# Patient Record
Sex: FEMALE | Race: BLACK OR AFRICAN AMERICAN | NOT HISPANIC OR LATINO | Employment: OTHER | URBAN - METROPOLITAN AREA
[De-identification: names, ages, dates, MRNs, and addresses within clinical notes are randomized per-mention and may not be internally consistent; named-entity substitution may affect disease eponyms.]

---

## 2017-11-08 ENCOUNTER — HOSPITAL ENCOUNTER (INPATIENT)
Facility: HOSPITAL | Age: 62
LOS: 8 days | Discharge: HOME | DRG: 435 | End: 2017-11-16
Attending: EMERGENCY MEDICINE | Admitting: INTERNAL MEDICINE
Payer: MEDICAID

## 2017-11-08 DIAGNOSIS — I82.220 NEOPLASM OF RIGHT KIDNEY WITH THROMBUS OF INFERIOR VENA CAVA: Primary | ICD-10-CM

## 2017-11-08 DIAGNOSIS — D49.511 NEOPLASM OF RIGHT KIDNEY WITH THROMBUS OF INFERIOR VENA CAVA: Primary | ICD-10-CM

## 2017-11-08 DIAGNOSIS — R16.0 LIVER MASSES: ICD-10-CM

## 2017-11-08 DIAGNOSIS — D72.829 LEUKOCYTOSIS: ICD-10-CM

## 2017-11-08 DIAGNOSIS — K86.89 MASS OF PANCREAS: ICD-10-CM

## 2017-11-08 DIAGNOSIS — R53.1 WEAKNESS: ICD-10-CM

## 2017-11-08 DIAGNOSIS — Z90.5 HISTORY OF NEPHRECTOMY: ICD-10-CM

## 2017-11-08 PROBLEM — E44.1 MILD MALNUTRITION: Status: ACTIVE | Noted: 2017-11-08

## 2017-11-08 PROBLEM — Z85.528 HISTORY OF RENAL CELL CARCINOMA: Status: ACTIVE | Noted: 2017-11-08

## 2017-11-08 PROBLEM — D50.9 IRON DEFICIENCY ANEMIA: Status: ACTIVE | Noted: 2017-11-08

## 2017-11-08 PROBLEM — D64.9 ANEMIA: Status: ACTIVE | Noted: 2017-11-08

## 2017-11-08 LAB
ALBUMIN SERPL BCP-MCNC: 2.2 G/DL
ALP SERPL-CCNC: 177 U/L
ALT SERPL W/O P-5'-P-CCNC: 10 U/L
ANION GAP SERPL CALC-SCNC: 12 MMOL/L
ANISOCYTOSIS BLD QL SMEAR: SLIGHT
AST SERPL-CCNC: 38 U/L
BASOPHILS # BLD AUTO: ABNORMAL K/UL
BASOPHILS NFR BLD: 0 %
BILIRUB SERPL-MCNC: 0.8 MG/DL
BILIRUB UR QL STRIP: NEGATIVE
BUN SERPL-MCNC: 31 MG/DL (ref 6–30)
BUN SERPL-MCNC: 33 MG/DL
CALCIUM SERPL-MCNC: 8.5 MG/DL
CHLORIDE SERPL-SCNC: 100 MMOL/L (ref 95–110)
CHLORIDE SERPL-SCNC: 102 MMOL/L
CLARITY UR REFRACT.AUTO: CLEAR
CO2 SERPL-SCNC: 22 MMOL/L
COLOR UR AUTO: YELLOW
CREAT SERPL-MCNC: 0.8 MG/DL
CREAT SERPL-MCNC: 1 MG/DL (ref 0.5–1.4)
DIFFERENTIAL METHOD: ABNORMAL
EOSINOPHIL # BLD AUTO: ABNORMAL K/UL
EOSINOPHIL NFR BLD: 0 %
ERYTHROCYTE [DISTWIDTH] IN BLOOD BY AUTOMATED COUNT: 15.4 %
EST. GFR  (AFRICAN AMERICAN): >60 ML/MIN/1.73 M^2
EST. GFR  (NON AFRICAN AMERICAN): >60 ML/MIN/1.73 M^2
GLUCOSE SERPL-MCNC: 95 MG/DL
GLUCOSE SERPL-MCNC: 95 MG/DL (ref 70–110)
GLUCOSE UR QL STRIP: NEGATIVE
HCT VFR BLD AUTO: 32 %
HCT VFR BLD CALC: 30 %PCV (ref 36–54)
HGB BLD-MCNC: 10.2 G/DL
HGB UR QL STRIP: NEGATIVE
HYPOCHROMIA BLD QL SMEAR: ABNORMAL
IMM GRANULOCYTES # BLD AUTO: ABNORMAL K/UL
IMM GRANULOCYTES NFR BLD AUTO: ABNORMAL %
KETONES UR QL STRIP: ABNORMAL
LACTATE SERPL-SCNC: 2 MMOL/L
LEUKOCYTE ESTERASE UR QL STRIP: ABNORMAL
LIPASE SERPL-CCNC: 54 U/L
LYMPHOCYTES # BLD AUTO: ABNORMAL K/UL
LYMPHOCYTES NFR BLD: 7 %
MCH RBC QN AUTO: 23.3 PG
MCHC RBC AUTO-ENTMCNC: 31.9 G/DL
MCV RBC AUTO: 73 FL
METAMYELOCYTES NFR BLD MANUAL: 1 %
MICROSCOPIC COMMENT: NORMAL
MONOCYTES # BLD AUTO: ABNORMAL K/UL
MONOCYTES NFR BLD: 5 %
MYELOCYTES NFR BLD MANUAL: 1 %
NEUTROPHILS NFR BLD: 86 %
NITRITE UR QL STRIP: NEGATIVE
NRBC BLD-RTO: 0 /100 WBC
PH UR STRIP: 5 [PH] (ref 5–8)
PLATELET # BLD AUTO: 322 K/UL
PLATELET BLD QL SMEAR: ABNORMAL
PMV BLD AUTO: 12.3 FL
POC IONIZED CALCIUM: 1.1 MMOL/L (ref 1.06–1.42)
POC TCO2 (MEASURED): 21 MMOL/L (ref 23–29)
POTASSIUM BLD-SCNC: 4.8 MMOL/L (ref 3.5–5.1)
POTASSIUM SERPL-SCNC: 4.9 MMOL/L
PROT SERPL-MCNC: 7.2 G/DL
PROT UR QL STRIP: NEGATIVE
RBC # BLD AUTO: 4.38 M/UL
RBC #/AREA URNS AUTO: 1 /HPF (ref 0–4)
SAMPLE: ABNORMAL
SODIUM BLD-SCNC: 135 MMOL/L (ref 136–145)
SODIUM SERPL-SCNC: 136 MMOL/L
SP GR UR STRIP: 1.01 (ref 1–1.03)
SQUAMOUS #/AREA URNS AUTO: 1 /HPF
TROPONIN I SERPL DL<=0.01 NG/ML-MCNC: <0.006 NG/ML
URN SPEC COLLECT METH UR: ABNORMAL
UROBILINOGEN UR STRIP-ACNC: NEGATIVE EU/DL
WBC # BLD AUTO: 24.62 K/UL
WBC #/AREA URNS AUTO: 1 /HPF (ref 0–5)
WBC TOXIC VACUOLES BLD QL SMEAR: PRESENT

## 2017-11-08 PROCEDURE — 84484 ASSAY OF TROPONIN QUANT: CPT

## 2017-11-08 PROCEDURE — 96360 HYDRATION IV INFUSION INIT: CPT

## 2017-11-08 PROCEDURE — 80053 COMPREHEN METABOLIC PANEL: CPT

## 2017-11-08 PROCEDURE — 99285 EMERGENCY DEPT VISIT HI MDM: CPT | Mod: ,,, | Performed by: EMERGENCY MEDICINE

## 2017-11-08 PROCEDURE — 93005 ELECTROCARDIOGRAM TRACING: CPT

## 2017-11-08 PROCEDURE — 93010 ELECTROCARDIOGRAM REPORT: CPT | Mod: ,,, | Performed by: INTERNAL MEDICINE

## 2017-11-08 PROCEDURE — 25500020 PHARM REV CODE 255: Performed by: EMERGENCY MEDICINE

## 2017-11-08 PROCEDURE — 81001 URINALYSIS AUTO W/SCOPE: CPT

## 2017-11-08 PROCEDURE — 25000003 PHARM REV CODE 250: Performed by: EMERGENCY MEDICINE

## 2017-11-08 PROCEDURE — 85007 BL SMEAR W/DIFF WBC COUNT: CPT

## 2017-11-08 PROCEDURE — 85027 COMPLETE CBC AUTOMATED: CPT

## 2017-11-08 PROCEDURE — 11000001 HC ACUTE MED/SURG PRIVATE ROOM

## 2017-11-08 PROCEDURE — 83605 ASSAY OF LACTIC ACID: CPT

## 2017-11-08 PROCEDURE — 83690 ASSAY OF LIPASE: CPT

## 2017-11-08 PROCEDURE — 99285 EMERGENCY DEPT VISIT HI MDM: CPT | Mod: 25

## 2017-11-08 RX ORDER — ACETAMINOPHEN 325 MG/1
650 TABLET ORAL EVERY 4 HOURS PRN
Status: DISCONTINUED | OUTPATIENT
Start: 2017-11-08 | End: 2017-11-16 | Stop reason: HOSPADM

## 2017-11-08 RX ORDER — ONDANSETRON 2 MG/ML
4 INJECTION INTRAMUSCULAR; INTRAVENOUS EVERY 6 HOURS PRN
Status: DISCONTINUED | OUTPATIENT
Start: 2017-11-08 | End: 2017-11-16 | Stop reason: HOSPADM

## 2017-11-08 RX ORDER — AMOXICILLIN 250 MG
1 CAPSULE ORAL DAILY PRN
Status: DISCONTINUED | OUTPATIENT
Start: 2017-11-08 | End: 2017-11-16 | Stop reason: HOSPADM

## 2017-11-08 RX ORDER — METRONIDAZOLE 500 MG/100ML
500 INJECTION, SOLUTION INTRAVENOUS
Status: DISCONTINUED | OUTPATIENT
Start: 2017-11-08 | End: 2017-11-09

## 2017-11-08 RX ORDER — GLUCAGON 1 MG
1 KIT INJECTION
Status: DISCONTINUED | OUTPATIENT
Start: 2017-11-08 | End: 2017-11-16 | Stop reason: HOSPADM

## 2017-11-08 RX ORDER — IBUPROFEN 200 MG
16 TABLET ORAL
Status: DISCONTINUED | OUTPATIENT
Start: 2017-11-08 | End: 2017-11-16 | Stop reason: HOSPADM

## 2017-11-08 RX ORDER — METOCLOPRAMIDE HYDROCHLORIDE 5 MG/ML
5 INJECTION INTRAMUSCULAR; INTRAVENOUS EVERY 6 HOURS PRN
Status: DISCONTINUED | OUTPATIENT
Start: 2017-11-08 | End: 2017-11-16 | Stop reason: HOSPADM

## 2017-11-08 RX ORDER — IBUPROFEN 200 MG
24 TABLET ORAL
Status: DISCONTINUED | OUTPATIENT
Start: 2017-11-08 | End: 2017-11-16 | Stop reason: HOSPADM

## 2017-11-08 RX ORDER — OXYCODONE HYDROCHLORIDE 5 MG/1
5 TABLET ORAL EVERY 4 HOURS PRN
Status: DISCONTINUED | OUTPATIENT
Start: 2017-11-08 | End: 2017-11-16 | Stop reason: HOSPADM

## 2017-11-08 RX ADMIN — IOHEXOL 75 ML: 350 INJECTION, SOLUTION INTRAVENOUS at 06:11

## 2017-11-08 RX ADMIN — SODIUM CHLORIDE 1000 ML: 0.9 INJECTION, SOLUTION INTRAVENOUS at 04:11

## 2017-11-08 NOTE — ED TRIAGE NOTES
"Patient states RUQ abd "cramping" Denies n/v. Positive weakness. Family states poor appetite and not eating. States baseline confusion on and off x 1 year.   "

## 2017-11-08 NOTE — ED PROVIDER NOTES
Encounter Date: 11/8/2017    SCRIBE #1 NOTE: I, Iram Morgan, am scribing for, and in the presence of,  Dr. Lenz. I have scribed the entire note.   SCRIBE #2 NOTE: I, Katie Fischer, am scribing for, and in the presence of,  Dr. Madden. I have scribed the following portions of the note - the Resident Attestation.     History     Chief Complaint   Patient presents with    Abdominal Pain     having pain on r side where kidney was removed 1 yr ago, nausea, not eating only drinking     Time patient was seen by the provider: 3:48 PM      The patient is a 62 y.o. female with hx of RCC s/p R nephrectomy in 2016, Hx of thyroid disorder ( no medication) and recently diagnosed liver and pancreatic masses ( June) w/ concern for metastatic disease and scheduled f/u on the 20th of Nov at [facility] presents to the ED with a complaint of decreased appetite and abdominal discomfort. Patient's family member states she has had a decrease in PO intake since Sunday, drinking only a fair amount of water. Yesterday she felt lightheaded and weak. She has had confusion for about a year intermittently. She has not seen a physician for her confusion at this time. Patient denies dysuria or any myalgias. She does have a tumor on her liver and pancreas that the family is aware of. Pt has no other complaints at this time.      The history is provided by the patient, a relative and medical records.     Review of patient's allergies indicates:  No Known Allergies  Past Medical History:   Diagnosis Date    Cancer     kidney    Renal disorder     Tumor liver      Past Surgical History:   Procedure Laterality Date    CHOLECYSTECTOMY      HYSTERECTOMY      kidney removal       History reviewed. No pertinent family history.  Social History   Substance Use Topics    Smoking status: Never Smoker    Smokeless tobacco: Never Used    Alcohol use No     Review of Systems   Constitutional: Positive for appetite change. Negative for fever.   HENT:  Negative for sore throat.    Respiratory: Negative for shortness of breath.    Cardiovascular: Negative for chest pain.   Gastrointestinal: Negative for nausea.   Genitourinary: Negative for dysuria.   Musculoskeletal: Negative for back pain.   Skin: Negative for rash.   Neurological: Positive for weakness and light-headedness.        (+)confusion   Hematological: Does not bruise/bleed easily.       Physical Exam     Initial Vitals [11/08/17 1432]   BP Pulse Resp Temp SpO2   (!) 131/92 (!) 116 18 97.8 °F (36.6 °C) 99 %      MAP       105         Physical Exam    Nursing note and vitals reviewed.  Constitutional: No distress.   HENT:   Head: Normocephalic and atraumatic.   Eyes: Pupils are equal, round, and reactive to light.   Neck: Normal range of motion. Neck supple.   Cardiovascular:   Tachycardic   Pulmonary/Chest: Breath sounds normal.   Abdominal: Soft.   Neurological:   Confusion which family states is baseline for her.   Skin: Skin is warm and dry.         ED Course   Procedures  Labs Reviewed   CBC W/ AUTO DIFFERENTIAL - Abnormal; Notable for the following:        Result Value    WBC 24.62 (*)     Hemoglobin 10.2 (*)     Hematocrit 32.0 (*)     MCV 73 (*)     MCH 23.3 (*)     MCHC 31.9 (*)     RDW 15.4 (*)     Gran% 86.0 (*)     Lymph% 7.0 (*)     All other components within normal limits   URINALYSIS, REFLEX TO URINE CULTURE - Abnormal; Notable for the following:     Ketones, UA Trace (*)     Leukocytes, UA Trace (*)     All other components within normal limits    Narrative:     Preferred Collection Type->Urine, Catheterized   COMPREHENSIVE METABOLIC PANEL - Abnormal; Notable for the following:     CO2 22 (*)     BUN, Bld 33 (*)     Calcium 8.5 (*)     Albumin 2.2 (*)     Alkaline Phosphatase 177 (*)     All other components within normal limits   ISTAT PROCEDURE - Abnormal; Notable for the following:     POC BUN 31 (*)     POC Sodium 135 (*)     POC TCO2 (MEASURED) 21 (*)     POC Hematocrit 30 (*)      All other components within normal limits   TROPONIN I   LACTIC ACID, PLASMA   URINALYSIS MICROSCOPIC    Narrative:     Preferred Collection Type->Urine, Catheterized   LIPASE   PROCALCITONIN   B-TYPE NATRIURETIC PEPTIDE   LIPASE    Narrative:     ADD ON LIPASE PER DR LEANDRO PARIKH/ORDER# [id number] @ 20:37 11/8/17     EKG Readings: (Independently Interpreted)   Sinus tachycardia. Non specific ST wave changes at 123 bpm.           Medical Decision Making:   History:   Old Medical Records: I decided to obtain old medical records.  Initial Assessment:   Weakness and difficulty getting around today. She is tachycardic and at risk for infection. Will do labs to determine disposition.   Independently Interpreted Test(s):   I have ordered and independently interpreted EKG Reading(s) - see prior notes  Clinical Tests:   Lab Tests: Ordered and Reviewed  Medical Tests: Ordered and Reviewed   Pt presents to the ED w/ complaints of decreased PO intake and nausea as well as pulling sensation/discomfort in the RUQ. Ddx includes but is not limited to dehydration, infection,metastatic disease, SBO.  Will obtain basic labs, lipase, XCR, EKG to evaluate the patient.     5:57 PM  CBC w/ significant leukocytosis  (24.62). As pt complained of RUQ pain prior to arrival will order CT abdo/pelvis w/ contrast to evaluate for intra-abdominal pathology. Will follow up w/ results.    7:24 PM  CT pelvis/abdomen w/ contrast consistent w/ multiple metastatic lesions in the liver ( largest in R lobe measuring 7.4 cm) as well as a mass in the pancrease. No obstruction noted. 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    8:03 PM  Discussed case w/ Dr. Madden. He agrees that an inpatient admission is required. Will start heparin bolus and drip for the newly found IVC thrombus after we obtain CT head to r/o metastases in the setting of AMS and likely metastatic disease affecting the liver and pancreas.     8:47 PM  Discussed case w/  that is the  overnight hospitalist - will admit to hospital medicine IM 2     Attending Note:  Physician Attestation Statement: I have personally seen and examined this patient. As the supervising MD I agree with the above history. As the supervising MD I agree with the above PE. As the supervising MD I agree with the above treatment, course, plan, and disposition.               Scribe Attestation:   Scribe #1: I performed the above scribed service and the documentation accurately describes the services I performed. I attest to the accuracy of the note.  Scribe #2: I performed the above scribed service and the documentation accurately describes the services I performed. I attest to the accuracy of the note.    Attending Attestation:   Physician Attestation Statement for Resident:  As the supervising MD   Physician Attestation Statement: I have personally seen and examined this patient.   I agree with the above history. -: Pt with PMHx of renal cell carcinoma with mets to liver and pancreas presents with right sided abdominal pain, nausea, decreased PO intake and slow decline in mental status over the last few months according to family. This pt was initially evaluated by Dr. Lenz where labs and CT abdomen/pelvis were ordered and pt was signed out to me. CT abdomen/pelvis demonstrates multiple liver lesions, a pancreas lesion and also IVC thrombus. Labs notable for leukocytosis. CT abdomen/pelvis does not demonstrate any definitive evidence of intraabdominal infection. Though given leukocytosis and tachycardia, felt she warranted dose of empiric antibiotics until more information could be obtained. Also ordered CT head to rule out metastatic lesions prior to initiating heparin drip for IVC thrombus. Discussion held with pt regarding transfer to [facility] given all of her care thus far as been at [facility]. Pt states she would prefer to stay here at [facility] and get her care here. Medicine called and accepted the pt for admission.   As the  supervising MD I agree with the above PE.    As the supervising MD I agree with the above treatment, course, plan, and disposition.  I have reviewed and agree with the residents interpretation of the following: lab data, x-rays, EKG and CT scans.                    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                           Chris Lenz MD  11/12/17 0110

## 2017-11-08 NOTE — ED NOTES
Patient identifiers verified and correct for MS Chapa  C/C: Abd pain  APPEARANCE: awake and alert to person and place only  SKIN: warm, dry and intact. No breakdown or bruising.  MUSCULOSKELETAL: Patient moving all extremities spontaneously, no obvious swelling or deformities noted. Ambulates independently.  RESPIRATORY: Denies shortness of breath.Respirations unlabored.   CARDIAC: Denies CP, 2+ distal pulses; no peripheral edema  ABDOMEN: S/ND/NT, Denies nausea or vomiting. Poor appetite  : voids spontaneously, denies difficulty  Neurologic: AAO x 4; follows commands equal strength in all extremities; denies numbness/tingling. Denies dizziness Positive weakness

## 2017-11-09 PROBLEM — R53.1 WEAKNESS: Status: ACTIVE | Noted: 2017-11-09

## 2017-11-09 LAB
ALBUMIN SERPL BCP-MCNC: 2.1 G/DL
ALP SERPL-CCNC: 168 U/L
ALT SERPL W/O P-5'-P-CCNC: 12 U/L
ANION GAP SERPL CALC-SCNC: 13 MMOL/L
APTT BLDCRRT: 33.4 SEC
AST SERPL-CCNC: 39 U/L
BASOPHILS # BLD AUTO: 0.03 K/UL
BASOPHILS NFR BLD: 0.2 %
BILIRUB SERPL-MCNC: 0.6 MG/DL
BUN SERPL-MCNC: 23 MG/DL
CALCIUM SERPL-MCNC: 8.8 MG/DL
CHLORIDE SERPL-SCNC: 102 MMOL/L
CO2 SERPL-SCNC: 22 MMOL/L
CREAT SERPL-MCNC: 0.8 MG/DL
DIFFERENTIAL METHOD: ABNORMAL
EOSINOPHIL # BLD AUTO: 0.3 K/UL
EOSINOPHIL NFR BLD: 1.8 %
ERYTHROCYTE [DISTWIDTH] IN BLOOD BY AUTOMATED COUNT: 15.3 %
EST. GFR  (AFRICAN AMERICAN): >60 ML/MIN/1.73 M^2
EST. GFR  (NON AFRICAN AMERICAN): >60 ML/MIN/1.73 M^2
ESTIMATED AVG GLUCOSE: 105 MG/DL
ESTIMATED AVG GLUCOSE: 105 MG/DL
FERRITIN SERPL-MCNC: 4230 NG/ML
GLUCOSE SERPL-MCNC: 103 MG/DL
HBA1C MFR BLD HPLC: 5.3 %
HBA1C MFR BLD HPLC: 5.3 %
HCT VFR BLD AUTO: 28.3 %
HGB BLD-MCNC: 9 G/DL
IMM GRANULOCYTES # BLD AUTO: 0.15 K/UL
IMM GRANULOCYTES NFR BLD AUTO: 0.8 %
INR PPP: 1.8
IRON SERPL-MCNC: 11 UG/DL
LACTATE SERPL-SCNC: 1.6 MMOL/L
LYMPHOCYTES # BLD AUTO: 1.2 K/UL
LYMPHOCYTES NFR BLD: 6.7 %
MAGNESIUM SERPL-MCNC: 2 MG/DL
MCH RBC QN AUTO: 23.4 PG
MCHC RBC AUTO-ENTMCNC: 31.8 G/DL
MCV RBC AUTO: 74 FL
MONOCYTES # BLD AUTO: 1.5 K/UL
MONOCYTES NFR BLD: 8.3 %
NEUTROPHILS # BLD AUTO: 15.1 K/UL
NEUTROPHILS NFR BLD: 82.2 %
NRBC BLD-RTO: 0 /100 WBC
PHOSPHATE SERPL-MCNC: 2.6 MG/DL
PLATELET # BLD AUTO: 240 K/UL
PMV BLD AUTO: 11.5 FL
POTASSIUM SERPL-SCNC: 4.1 MMOL/L
PROT SERPL-MCNC: 7 G/DL
PROTHROMBIN TIME: 18.2 SEC
RBC # BLD AUTO: 3.84 M/UL
SATURATED IRON: 6 %
SODIUM SERPL-SCNC: 137 MMOL/L
TOTAL IRON BINDING CAPACITY: 178 UG/DL
TRANSFERRIN SERPL-MCNC: 120 MG/DL
WBC # BLD AUTO: 18.32 K/UL

## 2017-11-09 PROCEDURE — 85025 COMPLETE CBC W/AUTO DIFF WBC: CPT

## 2017-11-09 PROCEDURE — 83036 HEMOGLOBIN GLYCOSYLATED A1C: CPT | Mod: 91

## 2017-11-09 PROCEDURE — 83540 ASSAY OF IRON: CPT

## 2017-11-09 PROCEDURE — 87040 BLOOD CULTURE FOR BACTERIA: CPT | Mod: 59

## 2017-11-09 PROCEDURE — 83036 HEMOGLOBIN GLYCOSYLATED A1C: CPT

## 2017-11-09 PROCEDURE — 84100 ASSAY OF PHOSPHORUS: CPT

## 2017-11-09 PROCEDURE — 83605 ASSAY OF LACTIC ACID: CPT

## 2017-11-09 PROCEDURE — 80053 COMPREHEN METABOLIC PANEL: CPT

## 2017-11-09 PROCEDURE — 63600175 PHARM REV CODE 636 W HCPCS: Performed by: STUDENT IN AN ORGANIZED HEALTH CARE EDUCATION/TRAINING PROGRAM

## 2017-11-09 PROCEDURE — 63600175 PHARM REV CODE 636 W HCPCS: Performed by: EMERGENCY MEDICINE

## 2017-11-09 PROCEDURE — 99223 1ST HOSP IP/OBS HIGH 75: CPT | Mod: ,,, | Performed by: HOSPITALIST

## 2017-11-09 PROCEDURE — 25000003 PHARM REV CODE 250: Performed by: EMERGENCY MEDICINE

## 2017-11-09 PROCEDURE — 85730 THROMBOPLASTIN TIME PARTIAL: CPT

## 2017-11-09 PROCEDURE — 63600175 PHARM REV CODE 636 W HCPCS: Performed by: INTERNAL MEDICINE

## 2017-11-09 PROCEDURE — S0030 INJECTION, METRONIDAZOLE: HCPCS | Performed by: EMERGENCY MEDICINE

## 2017-11-09 PROCEDURE — 11000001 HC ACUTE MED/SURG PRIVATE ROOM

## 2017-11-09 PROCEDURE — 36415 COLL VENOUS BLD VENIPUNCTURE: CPT

## 2017-11-09 PROCEDURE — 82728 ASSAY OF FERRITIN: CPT

## 2017-11-09 PROCEDURE — 97161 PT EVAL LOW COMPLEX 20 MIN: CPT

## 2017-11-09 PROCEDURE — 83735 ASSAY OF MAGNESIUM: CPT

## 2017-11-09 PROCEDURE — 85610 PROTHROMBIN TIME: CPT

## 2017-11-09 PROCEDURE — 97166 OT EVAL MOD COMPLEX 45 MIN: CPT

## 2017-11-09 RX ORDER — ENOXAPARIN SODIUM 100 MG/ML
1.5 INJECTION SUBCUTANEOUS DAILY
Qty: 30 SYRINGE | Refills: 5 | Status: SHIPPED | OUTPATIENT
Start: 2017-11-09

## 2017-11-09 RX ORDER — GLUCAGON 1 MG
1 KIT INJECTION
Status: DISCONTINUED | OUTPATIENT
Start: 2017-11-09 | End: 2017-11-16 | Stop reason: HOSPADM

## 2017-11-09 RX ORDER — IBUPROFEN 200 MG
16 TABLET ORAL
Status: DISCONTINUED | OUTPATIENT
Start: 2017-11-09 | End: 2017-11-16 | Stop reason: HOSPADM

## 2017-11-09 RX ORDER — IBUPROFEN 200 MG
24 TABLET ORAL
Status: DISCONTINUED | OUTPATIENT
Start: 2017-11-09 | End: 2017-11-16 | Stop reason: HOSPADM

## 2017-11-09 RX ORDER — ENOXAPARIN SODIUM 100 MG/ML
1.5 INJECTION SUBCUTANEOUS
Status: DISCONTINUED | OUTPATIENT
Start: 2017-11-09 | End: 2017-11-16 | Stop reason: HOSPADM

## 2017-11-09 RX ADMIN — CEFTRIAXONE 2 G: 2 INJECTION, SOLUTION INTRAVENOUS at 12:11

## 2017-11-09 RX ADMIN — ENOXAPARIN SODIUM 70 MG: 100 INJECTION SUBCUTANEOUS at 12:11

## 2017-11-09 RX ADMIN — METOCLOPRAMIDE 5 MG: 5 INJECTION, SOLUTION INTRAMUSCULAR; INTRAVENOUS at 01:11

## 2017-11-09 RX ADMIN — ONDANSETRON 4 MG: 2 INJECTION INTRAMUSCULAR; INTRAVENOUS at 01:11

## 2017-11-09 RX ADMIN — ONDANSETRON 4 MG: 2 INJECTION INTRAMUSCULAR; INTRAVENOUS at 10:11

## 2017-11-09 RX ADMIN — VANCOMYCIN HYDROCHLORIDE 1500 MG: 10 INJECTION, POWDER, LYOPHILIZED, FOR SOLUTION INTRAVENOUS at 12:11

## 2017-11-09 RX ADMIN — METRONIDAZOLE 500 MG: 500 INJECTION, SOLUTION INTRAVENOUS at 12:11

## 2017-11-09 NOTE — SUBJECTIVE & OBJECTIVE
Past Medical History:   Diagnosis Date    Cancer     kidney    Renal disorder     Tumor liver        Past Surgical History:   Procedure Laterality Date    CHOLECYSTECTOMY      HYSTERECTOMY      kidney removal         Review of patient's allergies indicates:  No Known Allergies    No current facility-administered medications on file prior to encounter.      No current outpatient prescriptions on file prior to encounter.     Family History     None        Social History Main Topics    Smoking status: Never Smoker    Smokeless tobacco: Never Used    Alcohol use No    Drug use: No    Sexual activity: Not on file     Review of Systems   Constitutional: Positive for activity change, appetite change, fatigue and unexpected weight change. Negative for chills and fever.   HENT: Negative for congestion.    Eyes: Negative for visual disturbance.   Respiratory: Negative for cough and shortness of breath.    Cardiovascular: Negative for chest pain and leg swelling.   Gastrointestinal: Positive for abdominal pain and nausea. Negative for blood in stool, constipation, diarrhea and vomiting.   Endocrine: Negative for polyphagia and polyuria.   Genitourinary: Negative for dysuria, flank pain and hematuria.   Musculoskeletal: Negative for back pain.   Skin: Negative for rash.   Neurological: Positive for dizziness, weakness and light-headedness. Negative for seizures, syncope, speech difficulty, numbness and headaches.   Hematological: Does not bruise/bleed easily.   Psychiatric/Behavioral: Negative for agitation and behavioral problems.     Objective:     Vital Signs (Most Recent):  Temp: 98.6 °F (37 °C) (11/08/17 2225)  Pulse: 106 (11/08/17 2225)  Resp: 18 (11/08/17 2225)  BP: 126/79 (11/08/17 2225)  SpO2: 97 % (11/08/17 2225) Vital Signs (24h Range):  Temp:  [97.8 °F (36.6 °C)-98.6 °F (37 °C)] 98.6 °F (37 °C)  Pulse:  [100-116] 106  Resp:  [18] 18  SpO2:  [97 %-99 %] 97 %  BP: (126-153)/(79-92) 126/79     Weight: 69.4  kg (153 lb)  Body mass index is 26.26 kg/m².    Physical Exam   Constitutional: She is oriented to person, place, and time. She appears well-developed and well-nourished.   HENT:   Head: Normocephalic and atraumatic.   Eyes: EOM are normal. Pupils are equal, round, and reactive to light.   Neck: Normal range of motion. Neck supple.   Cardiovascular: Normal rate, regular rhythm and normal heart sounds.    No murmur heard.  Pulmonary/Chest: Effort normal and breath sounds normal.   Abdominal: Soft. Bowel sounds are normal. There is no tenderness.   Right healed scar with laparoscopic healed scars   Musculoskeletal: Normal range of motion. She exhibits no edema.   Neurological: She is alert and oriented to person, place, and time. She exhibits normal muscle tone.   Gets distracted in the middle of answering the question and forget what was the question, has hard time comprehending questions, does know the president she can describe him but doesn't remember his name   Skin: Skin is warm and dry. Capillary refill takes less than 2 seconds.   Psychiatric: She has a normal mood and affect. Her behavior is normal.        Significant Labs:   CBC:   Recent Labs  Lab 11/08/17  1559 11/08/17  1802   WBC 24.62*  --    HGB 10.2*  --    HCT 32.0* 30*     --      CMP:   Recent Labs  Lab 11/08/17  1758      K 4.9      CO2 22*   GLU 95   BUN 33*   CREATININE 0.8   CALCIUM 8.5*   PROT 7.2   ALBUMIN 2.2*   BILITOT 0.8   ALKPHOS 177*   AST 38   ALT 10   ANIONGAP 12   EGFRNONAA >60.0     Urine Studies:   Recent Labs  Lab 11/08/17  1559   COLORU Yellow   APPEARANCEUA Clear   PHUR 5.0   SPECGRAV 1.015   PROTEINUA Negative   GLUCUA Negative   KETONESU Trace*   BILIRUBINUA Negative   OCCULTUA Negative   NITRITE Negative   UROBILINOGEN Negative   LEUKOCYTESUR Trace*   RBCUA 1   WBCUA 1   SQUAMEPITHEL 1       Significant Imaging: I have reviewed and interpreted all pertinent imaging results/findings within the past 24  hours.

## 2017-11-09 NOTE — PLAN OF CARE
Problem: Patient Care Overview  Goal: Plan of Care Review  Patient had an uneventful night last night. Pt resting quietly. VS stable. Will continue to monitor patient.

## 2017-11-09 NOTE — ASSESSMENT & PLAN NOTE
- Would wait for the medical records from [facility] as they have done the biopsy on 6/17.

## 2017-11-09 NOTE — PT/OT/SLP EVAL
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       Past Medical History:   Diagnosis Date    Cancer     kidney    Renal disorder     Tumor liver       Past Surgical History:   Procedure Laterality Date    CHOLECYSTECTOMY      HYSTERECTOMY      kidney removal         Referring physician: Kaylyn MARAVILLA   Date referred to OT: 11/8/17    General Precautions: Standard, fall  Orthopedic Precautions: N/A  Braces:      Do you have any cultural, spiritual, Uatsdin conflicts, given your current situation?: None stated     Patient History:  Living Environment  Lives With: other relative(s) (niece)  Living Arrangements: house  Home Accessibility: stairs to enter home  Number of Stairs to Enter Home: 5  Stair Railings at Home: none  Transportation Available: family or friend will provide  Living Environment Comment: Pt lives in 1 story home w niece with 4 steps to enter with no railing.  Pt was overall I with basic self care.  Equipment Currently Used at Home: none    Prior level of function:   Bed Mobility/Transfers: independent  Grooming: independent  Bathing: independent  Upper Body Dressing: independent  Lower Body Dressing: independent  Toileting: independent  Home Management Skills: independent  Homemaking Responsibilities: No  Driving License: No  Mode of Transportation: Family     Dominant hand: right    Subjective:  Communicated with RN prior to session.  "I feel weak."  Chief Complaint: weakness  Patient/Family stated goals: return home    Pain/Comfort  Pain Rating 1: 0/10    Objective:  Patient found with: telemetry    Cognitive Exam:  Oriented to: Person, Place, Time and Situation  Follows " Commands/attention: Follows two-step commands  Communication: clear/fluent  Memory:  No Deficits noted  Safety awareness/insight to disability: impaired  Coping skills/emotional control: Appropriate to situation    Visual/perceptual:  Intact    Physical Exam:  Postural examination/scapula alignment: Rounded shoulder and Head forward  Skin integrity: Visible skin intact  Edema: None noted     Sensation:   Intact    Upper Extremity Range of Motion:  Right Upper Extremity: WFL  Left Upper Extremity: WFL      Fine motor coordination:   Intact    Gross motor coordination: WFL    Functional Mobility:  Bed Mobility:  Rolling/Turning to Left: Contact guard assistance  Rolling/Turning Right: Contact guard assistance  Scooting/Bridging: Contact Guard Assistance  Supine to Sit: Contact Guard Assistance    Transfers:  Sit <> Stand Assistance: Minimum Assistance  Sit <> Stand Assistive Device: Rolling Walker    Functional Ambulation: Pt with poor balance for hallway mobility with rolling walker    Activities of Daily Living:   Pt was I with self care per pt and niece.  Pt with potential for return to PLOF    Balance:   Static Sit: FAIR-: Maintains without assist but inconsistent   Dynamic Sit: FAIR+: Maintains balance through MINIMAL excursions of active trunk motion  Static Stand: FAIR: Maintains without assist but unable to take challenges  Dynamic stand: FAIR: Needs CONTACT GUARD during gait    Therapeutic Activities and Exercises:  Pt educated on safety, importance of consistent participation in self care for gains.  Bed mobility with CGA    AM-PAC 6 CLICK ADL  How much help from another person does this patient currently need?  1 = Unable, Total/Dependent Assistance  2 = A lot, Maximum/Moderate Assistance  3 = A little, Minimum/Contact Guard/Supervision  4 = None, Modified [place]/Independent    Putting on and taking off regular lower body clothing? : 3  Bathing (including washing, rinsing, drying)?: 3  Toileting,  "which includes using toilet, bedpan, or urinal? : 3  Putting on and taking off regular upper body clothing?: 3  Taking care of personal grooming such as brushing teeth?: 3  Eating meals?: 4  Total Score: 19    AM-PAC Raw Score CMS "G-Code Modifier Level of Impairment Assistance   6 % Total / Unable   7 - 9 CM 80 - 100% Maximal Assist   10-14 CL 60 - 80% Moderate Assist   15 - 19 CK 40 - 60% Moderate Assist   20 - 22 CJ 20 - 40% Minimal Assist   23 CI 1-20% SBA / CGA   24 CH 0% Independent/ Mod I       Patient left supine with all lines intact, call button in reach and niece present    Assessment:  Yao Chapa is a 62 y.o. 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  Pt would benefit from skilled OT for max functional performance and gains and return to home. Skilled OT 3x week for gains.  Dc to SNF for max functional performance. .    Rehab identified problem list/impairments: Rehab identified problem list/impairments: impaired endurance, weakness, impaired self care skills, impaired balance    Rehab potential is good.    Activity tolerance: Good    Discharge recommendations: Discharge Facility/Level Of Care Needs: nursing facility, skilled     Barriers to discharge: Barriers to Discharge: Inaccessible home environment, Decreased caregiver support    Equipment recommendations: bath bench, walker, rolling     GOALS:    Occupational Therapy Goals        Problem: Occupational Therapy Goal    Goal Priority Disciplines Outcome Interventions   Occupational Therapy Goal     OT, PT/OT Ongoing (interventions implemented as appropriate)    Description:  Goals to be met by: 12/2    Patient will increase functional independence with ADLs by performing:    UE Dressing with [place].  LE Dressing with [place].  Grooming while standing at sink with Stand-by Assistance.  Toileting from toilet with " Stand-by Assistance for hygiene and clothing management.                       PLAN:  Patient to be seen 3 x/week to address the above listed problems via self-care/home management, therapeutic activities, therapeutic exercises  Plan of Care expires: 12/09/17  Plan of Care reviewed with: patient         Lelia Little, OT  11/09/2017

## 2017-11-09 NOTE — PLAN OF CARE
Authorization for release of confidential information faxed to [facility]. Will continue to follow.    Indy Blair RN  Ext [phone]

## 2017-11-09 NOTE — ASSESSMENT & PLAN NOTE
- Would wait for the medical records from [facility] as they have done the biopsy on 6/17.

## 2017-11-09 NOTE — HOSPITAL COURSE
"In The ED given "ceftrixone, vancomycin and metronidazole", I L Nacl, CT abdomen, head and CXR done with labs.    11/10: still tired with mild memory impairments, tachycardic on exam. consulted hem onc. And pending MRI brain final reads. Working with PT/OT, need SNF.       11/11: Stable. Still disoriented to place and time. MRI brain shows no evidence of mets. Ammonia WNL.     11/12: Stable. Oriented to person and place. Pending SNF placement. Hem/ Onc recommending f/u with [facility].    11/13: Stable. Oriented to person and place. Pending SNF placement. Hem/ Onc recommending f/u with [facility].    11/14: Stable. Hg dropped to 7.5, but no signs of bleeding. Will trend CBC q12 for today.             Awaiting placement. Hem/ Onc recommending f/u with [facility].  11/15: working with PT/OT. Stable VS and no active complaints. Pending SNF vs home.     11/16: NAEON. Vitals stable. Discharged to home with outpatient PT/OT referral.              Educated patient and family about Lovenox injections.              Reaffirmed outpatient hem/onc f/u at [facility]  INR elevated at 3.0  Factor 5 normal, Factor 8 elevated.  Given Vit k 5mg once daily x 3days at discharge  "

## 2017-11-09 NOTE — PLAN OF CARE
Problem: Patient Care Overview  Goal: Plan of Care Review  Outcome: Ongoing (interventions implemented as appropriate)  Alert appear weak talk in low voice denies any discomfort or pain no nausea, no sob v/s stable poor appetite no injury free of falls , bed alarm on , started on lovenox injection , plan oif care  reviewed with sister and niece prasanth Samaniego made

## 2017-11-09 NOTE — H&P
"[facility] Hospital Medicine  History & Physical    Patient Name: Yao Chapa  MRN: [id number]  Admission Date: 11/8/2017  Attending Physician: Jesse Benavides MD   Primary Care Provider: Primary Doctor [facility] Medicine Team: [facility] HOSP MED 2 Fabiana Avina MD     Patient information was obtained from patient and ER records.     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 Presented to ED with weakness, decreased appetite, nausea and abdominal discomfort.    She  Has been in her usual state of health until 4 days ago when she became more sleepy eating less and feels drained. She reports some nausea and RLQ pain that is more discomfort "gas", pain was improved by OTC medication, She state that she didn't have BM for 4 days"possiblly due to her not eating much" she had lost 10-15 lb in couple of months. Denies any vomiting any bleeding per rectum, any dysuria, fever, or chills, no hx of seizures. She and her sister jono pt ever been on chemotherapy or had radiation. She is not on any medication currently.  Pt is following at [facility] where she did the surgery, she has appointment on the 20th nov for follow up on her biopsy results but she prefers here.    Her memory problems started before the nephrectomy, where she forgets things, people but always were oriented.    Past Medical History:   Diagnosis Date    Cancer     kidney    Renal disorder     Tumor liver        Past Surgical History:   Procedure Laterality Date    CHOLECYSTECTOMY      HYSTERECTOMY      kidney removal         Review " of patient's allergies indicates:  No Known Allergies    No current facility-administered medications on file prior to encounter.      No current outpatient prescriptions on file prior to encounter.     Family History     None        Social History Main Topics    Smoking status: Never Smoker    Smokeless tobacco: Never Used    Alcohol use No    Drug use: No    Sexual activity: Not on file     Review of Systems   Constitutional: Positive for activity change, appetite change, fatigue and unexpected weight change. Negative for chills and fever.   HENT: Negative for congestion.    Eyes: Negative for visual disturbance.   Respiratory: Negative for cough and shortness of breath.    Cardiovascular: Negative for chest pain and leg swelling.   Gastrointestinal: Positive for abdominal pain and nausea. Negative for blood in stool, constipation, diarrhea and vomiting.   Endocrine: Negative for polyphagia and polyuria.   Genitourinary: Negative for dysuria, flank pain and hematuria.   Musculoskeletal: Negative for back pain.   Skin: Negative for rash.   Neurological: Positive for dizziness, weakness and light-headedness. Negative for seizures, syncope, speech difficulty, numbness and headaches.   Hematological: Does not bruise/bleed easily.   Psychiatric/Behavioral: Negative for agitation and behavioral problems.     Objective:     Vital Signs (Most Recent):  Temp: 98.6 °F (37 °C) (11/08/17 2225)  Pulse: 106 (11/08/17 2225)  Resp: 18 (11/08/17 2225)  BP: 126/79 (11/08/17 2225)  SpO2: 97 % (11/08/17 2225) Vital Signs (24h Range):  Temp:  [97.8 °F (36.6 °C)-98.6 °F (37 °C)] 98.6 °F (37 °C)  Pulse:  [100-116] 106  Resp:  [18] 18  SpO2:  [97 %-99 %] 97 %  BP: (126-153)/(79-92) 126/79     Weight: 69.4 kg (153 lb)  Body mass index is 26.26 kg/m².    Physical Exam   Constitutional: She is oriented to person, place, and time. She appears well-developed and well-nourished.   HENT:   Head: Normocephalic and atraumatic.   Eyes: EOM  are normal. Pupils are equal, round, and reactive to light.   Neck: Normal range of motion. Neck supple.   Cardiovascular: Normal rate, regular rhythm and normal heart sounds.    No murmur heard.  Pulmonary/Chest: Effort normal and breath sounds normal.   Abdominal: Soft. Bowel sounds are normal. There is no tenderness.   Right healed scar with laparoscopic healed scars   Musculoskeletal: Normal range of motion. She exhibits no edema.   Neurological: She is alert and oriented to person, place, and time. She exhibits normal muscle tone.   Gets distracted in the middle of answering the question and forget what was the question, has hard time comprehending questions, does know the president she can describe him but doesn't remember his name   Skin: Skin is warm and dry. Capillary refill takes less than 2 seconds.   Psychiatric: She has a normal mood and affect. Her behavior is normal.        Significant Labs:   CBC:   Recent Labs  Lab 11/08/17  1559 11/08/17  1802   WBC 24.62*  --    HGB 10.2*  --    HCT 32.0* 30*     --      CMP:   Recent Labs  Lab 11/08/17  1758      K 4.9      CO2 22*   GLU 95   BUN 33*   CREATININE 0.8   CALCIUM 8.5*   PROT 7.2   ALBUMIN 2.2*   BILITOT 0.8   ALKPHOS 177*   AST 38   ALT 10   ANIONGAP 12   EGFRNONAA >60.0     Urine Studies:   Recent Labs  Lab 11/08/17  1559   COLORU Yellow   APPEARANCEUA Clear   PHUR 5.0   SPECGRAV 1.015   PROTEINUA Negative   GLUCUA Negative   KETONESU Trace*   BILIRUBINUA Negative   OCCULTUA Negative   NITRITE Negative   UROBILINOGEN Negative   LEUKOCYTESUR Trace*   RBCUA 1   WBCUA 1   SQUAMEPITHEL 1       Significant Imaging: I have reviewed and interpreted all pertinent imaging results/findings within the past 24 hours.    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  - Liver/pancreatic mass s/p biopsy 6/17 at [facility].  - CT abdomen 11/9: there are innumerable  "hypoenhancing lesion seen throughout the hepatic parenchyma and a pancreatic mass.Thrombus is seen within the inferior vena cava right at the junction of the inferior vena cava and right atrium.  - Obtain medical records from [facility].   - If CT head showed no mets we would start Heparin drip for the IVC and RA thrombus.        Weakness    - most likely due to dehydration from poor oral intake, as her power on examination was normal but pt still feels weak and has not been ambulating.  - Pt/OT consulted appreciate their reccs.          Mild malnutrition    - Poor oral intake.  - Alb 2.2  - Dietary consulted ordered           History of nephrectomy    - due to right kidney mass 11/16          History of renal cell carcinoma    - would follow up on the medical records.          Mass of pancreas    - Would wait for the medical records from [facility] as they have done the biopsy on 6/17.          Liver masses    - Would wait for the medical records from [facility] as they have done the biopsy on 6/17.          Microcytic anemia    - Most likely iron deficiency anemia due to malnuration  - Iron study pending.          Leukocytosis    - SIRS 2/4 "tachycardia and leucocytosis"  - Pt has no obvious source of infection, w  - Bld clx pending, UA is negative  - Would start ceftriaxone empirically but would consider another Ddx for her leucocytosis            VTE Risk Mitigation         Ordered     High Risk of VTE  Once      11/08/17 2200     Place sequential compression device  Until discontinued      11/08/17 2200             Fabiana Avina MD  Department of Hospital Medicine   [facility]  "

## 2017-11-09 NOTE — ASSESSMENT & PLAN NOTE
- S/p right nephrectomy 11/2016 in [facility].  - Liver/pancreatic mass s/p biopsy 6/17 at [facility].  - CT abdomen 11/9: there are innumerable hypoenhancing lesion seen throughout the hepatic parenchyma and a pancreatic mass.Thrombus is seen within the inferior vena cava right at the junction of the inferior vena cava and right atrium.  - Obtain medical records from [facility].   - If CT head showed no mets we would start Heparin drip for the IVC and RA thrombus.

## 2017-11-09 NOTE — PLAN OF CARE
Problem: Physical Therapy Goal  Goal: Physical Therapy Goal  Goals to be met by: 17     Patient will increase functional independence with mobility by performin. Supine to sit with Stand-by Assistance  2. Sit to supine with Stand-by Assistance  3. Sit to stand transfer with Stand-by Assistance   4. Bed to chair transfer with Stand-by Assistance using Rolling Walker   5. Gait  x 100 feet with Stand-by Assistance using Rolling Walker.   6. Ascend/descend 4 stair with no Handrails Contact Guard Assistance  7. Lower extremity exercise program x15 reps per handout, with independence    Outcome: Ongoing (interventions implemented as appropriate)  PT Goals established following initial brittany Youngblood, PT  2017

## 2017-11-09 NOTE — PLAN OF CARE
Payor: MEDICAID / Plan: [facility] COMMUNITY PLAN [facility] ([place] MEDICAID) / Product Type: Managed Medicaid /        [facility] - [place], [place] - [address] AT [facility] [address]  [address]  [place] [address]  Phone: [phone] Fax: [phone]       11/09/17 1146   Discharge Assessment   Assessment Type Discharge Planning Assessment   Confirmed/corrected address and phone number on facesheet? Yes   Assessment information obtained from? Caregiver;Medical Record   Expected Length of Stay (days) 3   Communicated expected length of stay with patient/caregiver yes   Prior to hospitilization cognitive status: (Family states that patient is forgetful at times.)   Prior to hospitalization functional status: Independent   Current cognitive status: Unable to Assess   Current Functional Status: Independent   Lives With other relative(s)  (Niece)   Able to Return to Prior Arrangements unable to determine at this time (comments)   Is patient able to care for self after discharge? Unable to determine at this time (comments)   Who are your caregiver(s) and their phone number(s)? Alison Yu (niece) [phone]   Patient's perception of discharge disposition home or selfcare   Readmission Within The Last 30 Days no previous admission in last 30 days   Patient currently being followed by outpatient case management? No   Patient currently receives any other outside agency services? No   Do you have any problems affording any of your prescribed medications? No   Is the patient taking medications as prescribed? yes   Does the patient have transportation home? Yes   Transportation Available family or friend will provide   Does the patient receive services at the [facility]? No   Discharge Plan A Home with family   Patient/Family In Agreement With Plan yes

## 2017-11-09 NOTE — PT/OT/SLP EVAL
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    Past Medical History:   Diagnosis Date    Cancer     kidney    Renal disorder     Tumor liver       Past Surgical History:   Procedure Laterality Date    CHOLECYSTECTOMY      HYSTERECTOMY      kidney removal         Referring physician: Fabiana Avina MD  Date referred to PT: 11/08/17    General Precautions: Standard, fall  Orthopedic Precautions: N/A   Braces:              Patient History:  Lives With: other relative(s) (niece)  Living Arrangements: house  Home Accessibility: stairs to enter home  Number of Stairs to Enter Home: 5  Stair Railings at Home: none  Transportation Available: family or friend will provide  Living Environment Comment: Patient lives in a single story house w/ her niece, 4STE w/ no HR. Patient was recieiving occasional assistance w/ ADL's and has no DME.   Equipment Currently Used at Home: none  DME owned (not currently used): none    Previous Level of Function:  Ambulation Skills: needs device  Transfer Skills: needs device  ADL Skills: needs assist  Work/Leisure Activity: needs assist    Subjective:  Communicated with nurse prior to session.  Patient tired but agrees to participate in therapy session.  Chief Complaint: weakness and decreased endurance  Patient goals: to get better    Pain/Comfort  Pain Rating 1: 0/10      Objective:   Patient found with: telemetry     Cognitive Exam:  Oriented to: Person, Place, Time and Situation    Follows Commands/attention: Follows multistep  commands  Communication: clear/fluent  Safety awareness/insight to disability: intact    Physical Exam:  Postural  examination/scapula alignment: No postural abnormalities identified    Skin integrity: Visible skin intact  Edema: None noted     Sensation:   Intact    Upper Extremity Range of Motion:  Right Upper Extremity: WFL  Left Upper Extremity: WFL    Upper Extremity Strength:  Right Upper Extremity: WFL  Left Upper Extremity: WFL    Lower Extremity Range of Motion:  Right Lower Extremity: WFL  Left Lower Extremity: WFL    Lower Extremity Strength: (grossly 3+/5)  Right Lower Extremity: WFL  Left Lower Extremity: WFL     Fine motor coordination:  Intact    Gross motor coordination: WFL    Functional Mobility:  Bed Mobility:  Rolling/Turning to Left: Stand by assistance  Rolling/Turning Right: Stand by assistance  Scooting/Bridging: Stand by Assistance  Supine to Sit: Stand by Assistance  Sit to Supine: Stand by Assistance    Transfers:  Sit <> Stand Assistance: Contact Guard Assistance  Sit <> Stand Assistive Device: No Assistive Device    Gait:   Gait Distance: 110'  Assistance 1: Contact Guard Assistance  Gait Assistive Device: Rolling walker  Gait Pattern: reciprocal  Gait Deviation(s): decreased jayden, decreased velocity of limb motion, decreased stride length    Stairs:  Patient attempted to navigate stairs w/ no HR but demonstrated increased difficulty even with Mod-Max A.    Balance:   Static Sit: GOOD-: Takes MODERATE challenges from all directions but inconsistently  Dynamic Sit: GOOD-: Maintains balance through MODERATE excursions of active trunk movement,     Static Stand: FAIR: Maintains without assist but unable to take challenges  Dynamic stand: FAIR: Needs CONTACT GUARD during gait    Therapeutic Activities and Exercises:  PT Maricruz completed    AM-PAC 6 CLICK MOBILITY  How much help from another person does this patient currently need?   1 = Unable, Total/Dependent Assistance  2 = A lot, Maximum/Moderate Assistance  3 = A little, Minimum/Contact Guard/Supervision  4 = None, Modified  [place]/Independent    Turning over in bed (including adjusting bedclothes, sheets and blankets)?: 4  Sitting down on and standing up from a chair with arms (e.g., wheelchair, bedside commode, etc.): 3  Moving from lying on back to sitting on the side of the bed?: 4  Moving to and from a bed to a chair (including a wheelchair)?: 3  Need to walk in hospital room?: 3  Climbing 3-5 steps with a railing?: 2  Total Score: 19     AM-PAC Raw Score CMS G-Code Modifier Level of Impairment Assistance   6 % Total / Unable   7 - 9 CM 80 - 100% Maximal Assist   10 - 14 CL 60 - 80% Moderate Assist   15 - 19 CK 40 - 60% Moderate Assist   20 - 22 CJ 20 - 40% Minimal Assist   23 CI 1-20% SBA / CGA   24 CH 0% Independent/ Mod I     Patient left sitting EOB with all lines intact, call button in reach, nurse notified and niece present.    Assessment:   Yao Chapa is a 62 y.o. 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 Patient is grossly weak and requires assistance for out of bed activity. Patient was not able to navigate stairs today 2/2 increased weakness and fatigue. Patient will benefit from skilled PT services to address her functional mobility, improve independence, and to decrease caregiver burden. Patient's medical status is evolving and her eval complexity is low.     Rehab identified problem list/impairments: Rehab identified problem list/impairments: impaired endurance, impaired self care skills, impaired functional mobilty, gait instability, impaired balance    Rehab potential is good.    Activity tolerance: Good    Discharge recommendations: Discharge Facility/Level Of Care Needs: nursing facility, skilled     Barriers to discharge: Barriers to Discharge: Inaccessible home environment    Equipment recommendations: Equipment Needed After Discharge: bath bench, walker, rolling      GOALS:    Physical Therapy Goals        Problem: Physical Therapy Goal    Goal Priority Disciplines Outcome Goal Variances Interventions   Physical Therapy Goal     PT/OT, PT Ongoing (interventions implemented as appropriate)     Description:  Goals to be met by: 17     Patient will increase functional independence with mobility by performin. Supine to sit with Stand-by Assistance  2. Sit to supine with Stand-by Assistance  3. Sit to stand transfer with Stand-by Assistance   4. Bed to chair transfer with Stand-by Assistance using Rolling Walker   5. Gait  x 100 feet with Stand-by Assistance using Rolling Walker.   6. Ascend/descend 4 stair with no Handrails Contact Guard Assistance  7. Lower extremity exercise program x15 reps per handout, with independence                      PLAN:    Patient to be seen 4 x/week to address the above listed problems via gait training, therapeutic activities, therapeutic exercises, neuromuscular re-education  Plan of Care expires: 17  Plan of Care reviewed with: patient          Filiberto Youngblood, PT  2017

## 2017-11-09 NOTE — ASSESSMENT & PLAN NOTE
- most likely due to dehydration from poor oral intake, as her power on examination was normal but pt still feels weak and has not been ambulating.  - Pt/OT consulted appreciate their reccs.

## 2017-11-09 NOTE — ASSESSMENT & PLAN NOTE
"- SIRS 2/4 "tachycardia and leucocytosis"  - Pt has no obvious source of infection, w  - Bld clx pending, UA is negative  - Would start ceftriaxone empirically but would consider another Ddx for her leucocytosis    "

## 2017-11-09 NOTE — ED NOTES
Glory on 9th floor aware of patient not receiving antibiotics due to unable to obtain blood cultures. phlebotomy aware of cultures and will draw as soon as possible

## 2017-11-09 NOTE — PLAN OF CARE
Problem: Occupational Therapy Goal  Goal: Occupational Therapy Goal  Goals to be met by: 12/2    Patient will increase functional independence with ADLs by performing:    UE Dressing with [place].  LE Dressing with [place].  Grooming while standing at sink with Stand-by Assistance.  Toileting from toilet with Stand-by Assistance for hygiene and clothing management.     Outcome: Ongoing (interventions implemented as appropriate)  Eval complete and goals set.  Cont with POC  Lelia Little OT  11/9/2017

## 2017-11-09 NOTE — PHARMACY MED REC
Admission Medication Reconciliation - Pharmacy Consult Note    The home medication history was taken by Coby Rooney, Pharmacy Technician    No issues noted with the medication reconciliation.  Patient requests bedside delivery of any new meds    Steven Griffin PharmD  [id number]       Patient's prior to admission medication regimen was as follows:  No prescriptions prior to admission.         Please add appropriate    SmartPhrase below:

## 2017-11-09 NOTE — HPI
"This is a 63 y/o female with history of Renal mass s/p right nephrectomy 2016, liver and pancreatic mass s/p biopsy 6/2017 and memory problems for over 1 year. Presented to ED with weakness, decreased appetite, nausea and abdominal discomfort.    She  Has been in her usual state of health until 4 days ago when she became more sleepy eating less and feels drained. She reports some nausea and RLQ pain that is more discomfort "gas", pain was improved by OTC medication, She state that she didn't have BM for 4 days"possiblly due to her not eating much" she had lost 10-15 lb in couple of months. Denies any vomiting any bleeding per rectum, any dysuria, fever, or chills, no hx of seizures. She and her sister jono pt ever been on chemotherapy or had radiation. She is not on any medication currently.  Pt is following at [facility] where she did the surgery, she has appointment on the 20th nov for follow up on her biopsy results but she prefers here.    Her memory problems started before the nephrectomy, where she forgets things, people but always were oriented.  "

## 2017-11-10 PROBLEM — C80.1 CANCER WITH UNKNOWN PRIMARY SITE: Status: ACTIVE | Noted: 2017-11-10

## 2017-11-10 LAB
AMMONIA PLAS-SCNC: 20 UMOL/L
BASOPHILS # BLD AUTO: 0.02 K/UL
BASOPHILS NFR BLD: 0.1 %
DIFFERENTIAL METHOD: ABNORMAL
EOSINOPHIL # BLD AUTO: 0.3 K/UL
EOSINOPHIL NFR BLD: 1.8 %
ERYTHROCYTE [DISTWIDTH] IN BLOOD BY AUTOMATED COUNT: 15.3 %
HCT VFR BLD AUTO: 26.8 %
HGB BLD-MCNC: 8.5 G/DL
IMM GRANULOCYTES # BLD AUTO: 0.21 K/UL
IMM GRANULOCYTES NFR BLD AUTO: 1.1 %
INR PPP: 1.9
LYMPHOCYTES # BLD AUTO: 1.3 K/UL
LYMPHOCYTES NFR BLD: 6.9 %
MCH RBC QN AUTO: 23 PG
MCHC RBC AUTO-ENTMCNC: 31.7 G/DL
MCV RBC AUTO: 72 FL
MONOCYTES # BLD AUTO: 1.8 K/UL
MONOCYTES NFR BLD: 9.4 %
NEUTROPHILS # BLD AUTO: 15.1 K/UL
NEUTROPHILS NFR BLD: 80.7 %
NRBC BLD-RTO: 0 /100 WBC
PLATELET # BLD AUTO: 232 K/UL
PMV BLD AUTO: 12.2 FL
PROTHROMBIN TIME: 18.9 SEC
RBC # BLD AUTO: 3.7 M/UL
WBC # BLD AUTO: 18.71 K/UL

## 2017-11-10 PROCEDURE — 86580 TB INTRADERMAL TEST: CPT | Performed by: INTERNAL MEDICINE

## 2017-11-10 PROCEDURE — 25000003 PHARM REV CODE 250: Performed by: STUDENT IN AN ORGANIZED HEALTH CARE EDUCATION/TRAINING PROGRAM

## 2017-11-10 PROCEDURE — 25000003 PHARM REV CODE 250: Performed by: INTERNAL MEDICINE

## 2017-11-10 PROCEDURE — 25500020 PHARM REV CODE 255: Performed by: INTERNAL MEDICINE

## 2017-11-10 PROCEDURE — 36415 COLL VENOUS BLD VENIPUNCTURE: CPT

## 2017-11-10 PROCEDURE — 11000001 HC ACUTE MED/SURG PRIVATE ROOM

## 2017-11-10 PROCEDURE — 99232 SBSQ HOSP IP/OBS MODERATE 35: CPT | Mod: ,,, | Performed by: INTERNAL MEDICINE

## 2017-11-10 PROCEDURE — 82140 ASSAY OF AMMONIA: CPT

## 2017-11-10 PROCEDURE — 85025 COMPLETE CBC W/AUTO DIFF WBC: CPT

## 2017-11-10 PROCEDURE — A9585 GADOBUTROL INJECTION: HCPCS | Performed by: INTERNAL MEDICINE

## 2017-11-10 PROCEDURE — 85610 PROTHROMBIN TIME: CPT

## 2017-11-10 PROCEDURE — 63600175 PHARM REV CODE 636 W HCPCS: Performed by: INTERNAL MEDICINE

## 2017-11-10 RX ORDER — CALCIUM CARBONATE 500(1250)
500 TABLET ORAL ONCE
Status: COMPLETED | OUTPATIENT
Start: 2017-11-10 | End: 2017-11-10

## 2017-11-10 RX ORDER — GADOBUTROL 604.72 MG/ML
5 INJECTION INTRAVENOUS
Status: COMPLETED | OUTPATIENT
Start: 2017-11-10 | End: 2017-11-10

## 2017-11-10 RX ADMIN — CALCIUM 500 MG: 500 TABLET ORAL at 08:11

## 2017-11-10 RX ADMIN — ENOXAPARIN SODIUM 70 MG: 100 INJECTION SUBCUTANEOUS at 12:11

## 2017-11-10 RX ADMIN — GADOBUTROL 5 ML: 604.72 INJECTION INTRAVENOUS at 01:11

## 2017-11-10 RX ADMIN — TUBERCULIN PURIFIED PROTEIN DERIVATIVE 5 UNITS: 5 INJECTION, SOLUTION INTRADERMAL at 12:11

## 2017-11-10 RX ADMIN — SODIUM CHLORIDE 1000 ML: 0.9 INJECTION, SOLUTION INTRAVENOUS at 08:11

## 2017-11-10 NOTE — ASSESSMENT & PLAN NOTE
- pathology report from liver biopsy suggests a malignancy different from her history of renal cell carcinoma  - at this time, site of origin of her metastatic disease is unknown. Staining was pancytokeratin-positive, which points to carcinoma. It is ALBERTINA 3 positive, which points towards breast or genitourinary primary. Mammogram from 1/20/16 was negative.  - I reviewed the imaging with radiology. No filling defect was seen in left ureter, and no bladder masses were seen as well.  - recommend mammogram  - agree with MRI brain  - she has an elevated INR. Her alkaline phosphatase is mildly elevated. Recommend checking an ammonia to evaluate for hepatic encephalopathy.  - she has iron deficiency anemia. She has had a hysterectomy. Consider checking stool for occult blood.   - consider urine cytology and urology consult.   - if further workup does not reveal a primary site, she can be treated as a metastatic cancer of unknown primary. A reasonable treatment regimen would be gemcitabine/cisplatin: gemcitabine 1250mg/m2 IV days 1,8 and cisplatin 100 mg/m2 IV day 1; repeat every 3 weeks.  - she is a patient of hematologist/oncologist Dr. Trinidad. She has a follow-up appointment scheduled for 11/20/17. Patient has Medicaid, so her follow-up care should be within the [facility] system.

## 2017-11-10 NOTE — PROGRESS NOTES
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 Presented to ED with weakness, decreased appetite, nausea and abdominal discomfort.    She  Has been in her usual state of health until 4 days ago when she became more sleepy eating less and feels drained. She reports some nausea and RLQ pain that is more discomfort "gas", pain was improved by OTC medication, She state that she didn't have BM for 4 days"possiblly due to her not eating much" she had lost 10-15 lb in couple of months. Denies any vomiting any bleeding per rectum, any dysuria, fever, or chills, no hx of seizures. She and her sister joon pt ever been on chemotherapy or had radiation. She is not on any medication currently.  Pt is following at [facility] where she did the surgery, she has appointment on the 20th nov for follow up on her biopsy results but she prefers here.    Her memory problems started before the nephrectomy, where she forgets things, people but always were oriented.    Hospital Course:  In The ED given "ceftrixone, vancomycin and metronidazole", I L Nacl, CT abdomen, head and CXR done with labs.    11/10: still tired with mild memory impairments, tachycardic on exam. consulted hem onc. And pending MRI brain final reads. Working with PT/OT, need SNF.           Review of Systems   Constitutional: Positive for activity change, appetite change, fatigue and unexpected weight change. Negative " for chills and fever.   HENT: Negative for congestion.    Eyes: Negative for visual disturbance.   Respiratory: Negative for cough and shortness of breath.    Cardiovascular: Negative for chest pain and leg swelling.   Gastrointestinal: Positive for abdominal pain and nausea. Negative for blood in stool, constipation, diarrhea and vomiting.   Endocrine: Negative for polyphagia and polyuria.   Genitourinary: Negative for dysuria, flank pain and hematuria.   Musculoskeletal: Negative for back pain.   Skin: Negative for rash.   Neurological: Positive for dizziness, weakness and light-headedness. Negative for seizures, syncope, speech difficulty, numbness and headaches.   Hematological: Does not bruise/bleed easily.   Psychiatric/Behavioral: Negative for agitation and behavioral problems.     Objective:     Vital Signs (Most Recent):  Temp: 97.9 °F (36.6 °C) (11/10/17 1610)  Pulse: (!) 112 (11/10/17 1610)  Resp: 18 (11/10/17 1610)  BP: 138/71 (11/10/17 1610)  SpO2: 96 % (11/10/17 1610) Vital Signs (24h Range):  Temp:  [96.2 °F (35.7 °C)-99.2 °F (37.3 °C)] 97.9 °F (36.6 °C)  Pulse:  [] 112  Resp:  [17-18] 18  SpO2:  [93 %-98 %] 96 %  BP: (121-138)/(57-83) 138/71     Weight: 49.5 kg (109 lb 1.6 oz)  Body mass index is 18.73 kg/m².    Intake/Output Summary (Last 24 hours) at 11/10/17 1658  Last data filed at 11/09/17 2200   Gross per 24 hour   Intake              220 ml   Output                0 ml   Net              220 ml      Physical Exam   Constitutional: She is oriented to person, place, and time. She appears well-developed and well-nourished.   HENT:   Head: Normocephalic and atraumatic.   Eyes: EOM are normal. Pupils are equal, round, and reactive to light.   Neck: Normal range of motion. Neck supple.   Cardiovascular: Normal rate, regular rhythm and normal heart sounds.    No murmur heard.  Pulmonary/Chest: Effort normal and breath sounds normal.   Abdominal: Soft. Bowel sounds are normal. There is no  tenderness.   Right healed scar with laparoscopic healed scars   Musculoskeletal: Normal range of motion. She exhibits no edema.   Neurological: She is alert and oriented to person, place, and time. She exhibits normal muscle tone.   Gets distracted in the middle of answering the question and forget what was the question, has hard time comprehending questions, does know the president she can describe him but doesn't remember his name   Skin: Skin is warm and dry. Capillary refill takes less than 2 seconds.   Psychiatric: She has a normal mood and affect. Her behavior is normal.       Significant Labs: All pertinent labs within the past 24 hours have been reviewed.        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  - Liver/pancreatic mass s/p biopsy 6/17 at [facility].  - CT abdomen 11/9: there are innumerable hypoenhancing lesion seen throughout the hepatic parenchyma and a pancreatic mass.Thrombus is seen within the inferior vena cava right at the junction of the inferior vena cava and right atrium.  - Obtain medical records from [facility].   - If CT head showed no mets we would start Heparin drip for the IVC and RA thrombus.        Cancer with unknown primary site    -- by [facility] path   -- consulted hem /onc  -- no signs of obstruction.           Weakness    - most likely due to dehydration from poor oral intake, as her power on examination was normal but pt still feels weak and has not been ambulating.  - Pt/OT consulted, SNF recommended           Mild malnutrition    - Poor oral intake.  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            Mass of pancreas              Liver masses    - she had Carcinoma of unknown origin as per path reports. Likely histo resemblance as  or  "cholangiocarcinoma as primary.   -- pending oncology final recs         Microcytic anemia    - Most likely iron deficiency anemia due to malnuration            Leukocytosis    - SIRS 2/4 "tachycardia and leucocytosis", improving WBC trends.   - Pt has no obvious source of infection.  - Bld clx pending, UA is negative  - keep off Abx          VTE Risk Mitigation         Ordered     enoxaparin injection 70 mg  Every 24 hours (non-standard times)     Route:  Subcutaneous        11/09/17 1124     High Risk of VTE  Once      11/09/17 0739     Place sequential compression device  Until discontinued      11/09/17 0739     Place sequential compression device  Until discontinued      11/08/17 2200              GRABIEL Resendez  Department of Hospital Medicine   [facility]  "

## 2017-11-10 NOTE — CONSULTS
[facility]  Adult Nutrition  Consult Note    SUMMARY     Recommendations    Recommendation/Intervention: 1. Continue w/ Regular 2g diet. 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 likes to eat fruit per RN  at bedside.     Nutrition Prescription Ordered    Current Diet Order: 2g       Evaluation of Received Nutrients/Fluid Intake     Energy Calories Required: not meeting needs   Protein Required: not meeting needs   I/O: +640ml since admit  Fluid Required: meeting needs  Comments: LBM: none noted  % Intake of Estimated Energy Needs: 0 - 25 %  % Meal Intake: 0-10%    Nutrition Risk Screen     Nutrition Risk Screen: no indicators present    Nutrition/Diet History    Patient Reported Diet/Restrictions/Preferences: general  Typical Food/Fluid Intake: pt state enough not to be hungry.  Food Preferences: RACHEL   Factors Affecting Nutritional Intake: decreased appetite, early satiety     Labs/Tests/Procedures/Med's    Pertinent Labs Reviewed: reviewed  Pertinent Labs Comments: unremarkable, Alk [id number]  Pertinent Medications Reviewed: reviewed  Pertinent Medications Comments: Vanc.    Physical Findings    Overall  "Physical Appearance: underweight, weak  Oral/Mouth Cavity: all teeth present (age appropriate)  Skin: intact    Anthropometrics    Temp: 96.2 °F (35.7 °C)  Height: 5' 4" (162.6 cm)  Weight Method: Bed Scale  Weight: 49.5 kg (109 lb 1.6 oz)  Ideal Body Weight (IBW), Female: 120 lb  % Ideal Body Weight, Female (lb): 90.92 lb  BMI (Calculated): 18.8  BMI Grade: 18.5-24.9 - normal  Weight Loss: unintentional     Estimated/Assessed Needs    Weight Used For Calorie Calculations: 49.5 kg (109 lb 2 oz)   Height (cm): 162.6 cm  Energy Calorie Requirements (kcal): 1133kcal  Energy Need Method: [place]-St Jeor (x 1.1 (PAL))   RMR ([place]-St. Jeor Equation): 1030   Weight Used For Protein Calculations: 49.5 kg (109 lb 2 oz)  Protein Requirements: 50-60g/d  RDA Method (mL): 1133  CHO Requirement: na     Assessment and Plan    Mild malnutrition    Related to (etiology):   Decreased appetite/ intake, RD suspects Anorexia     Signs and Symptoms (as evidenced by):   PO intake 0-25%, pt very reluctant to speak about food (see comments above)    Interventions/Recommendations (treatment strategy):  Consult Psychology to inquire farther about eating disorder  See recs above    Nutrition Diagnosis Status:   New              Monitor and Evaluation    Food and Nutrient Intake: energy intake, food and beverage intake, enteral nutrition intake  Food and Nutrient Administration: enteral and parenteral nutrition administration, diet order  Knowledge/Beliefs/Attitudes: beliefs and attitudes  Physical Activity and Function: nutrition-related ADLs and IADLs  Anthropometric Measurements: weight, weight change  Biochemical Data, Medical Tests and Procedures:  (All labs)  Nutrition-Focused Physical Findings: overall appearance    Nutrition Risk    Level of Risk:  (f/u 2x/wk)    Nutrition Follow-Up     yes    "

## 2017-11-10 NOTE — ASSESSMENT & PLAN NOTE
Related to (etiology):   Decreased appetite/ intake, RD suspects Anorexia     Signs and Symptoms (as evidenced by):   PO intake 0-25%, pt very reluctant to speak about food (see comments above)    Interventions/Recommendations (treatment strategy):  Consult Psychology to inquire farther about eating disorder  See recs above    Nutrition Diagnosis Status:   New

## 2017-11-10 NOTE — ASSESSMENT & PLAN NOTE
"- SIRS 2/4 "tachycardia and leucocytosis", improving WBC trends.   - Pt has no obvious source of infection.  - Bld clx pending, UA is negative  - keep off Abx  "

## 2017-11-10 NOTE — SUBJECTIVE & OBJECTIVE
Oncology Treatment Plan:   [No treatment plan]    Medications:  Continuous Infusions:   Scheduled Meds:   enoxaparin  1.5 mg/kg Subcutaneous Q24H     PRN Meds:acetaminophen, dextrose 50%, dextrose 50%, dextrose 50%, dextrose 50%, glucagon (human recombinant), glucagon (human recombinant), glucose, glucose, glucose, glucose, metoclopramide HCl, ondansetron, oxyCODONE, senna-docusate 8.6-50 mg     Review of patient's allergies indicates:  No Known Allergies     Past Medical History:   Diagnosis Date    Cancer     kidney    Renal disorder     Tumor liver      Past Surgical History:   Procedure Laterality Date    CHOLECYSTECTOMY      HYSTERECTOMY      kidney removal       Family History     None        Social History Main Topics    Smoking status: Never Smoker    Smokeless tobacco: Never Used    Alcohol use No    Drug use: No    Sexual activity: Not on file       Review of Systems   Constitutional: Positive for activity change, appetite change, fatigue and unexpected weight change. Negative for chills and fever.   HENT: Negative for congestion.    Eyes: Negative for visual disturbance.   Respiratory: Negative for cough and shortness of breath.    Cardiovascular: Negative for chest pain and leg swelling.   Gastrointestinal: Positive for abdominal pain and nausea. Negative for blood in stool, constipation, diarrhea and vomiting.   Endocrine: Negative for polyphagia and polyuria.   Genitourinary: Negative for dysuria, flank pain and hematuria.   Musculoskeletal: Negative for back pain.   Skin: Negative for rash.   Neurological: Positive for dizziness, weakness and light-headedness. Negative for seizures, syncope, speech difficulty, numbness and headaches.   Hematological: Does not bruise/bleed easily.   Psychiatric/Behavioral: Negative for agitation and behavioral problems.     Objective:     Vital Signs (Most Recent):  Temp: 97.9 °F (36.6 °C) (11/10/17 0750)  Pulse: 101 (11/10/17 0750)  Resp: 17 (11/10/17  0750)  BP: 126/75 (11/10/17 0750)  SpO2: 98 % (11/10/17 0750) Vital Signs (24h Range):  Temp:  [96.2 °F (35.7 °C)-99.2 °F (37.3 °C)] 97.9 °F (36.6 °C)  Pulse:  [100-113] 101  Resp:  [17-18] 17  SpO2:  [93 %-98 %] 98 %  BP: (119-137)/(75-83) 126/75     Weight: 49.5 kg (109 lb 1.6 oz)  Body mass index is 18.73 kg/m².  Body surface area is 1.5 meters squared.      Intake/Output Summary (Last 24 hours) at 11/10/17 0942  Last data filed at 11/09/17 2200   Gross per 24 hour   Intake              320 ml   Output                0 ml   Net              320 ml       Physical Exam   Constitutional: She is oriented to person, place, and time. She appears well-developed and well-nourished.   HENT:   Head: Normocephalic and atraumatic.   Eyes: EOM are normal. Pupils are equal, round, and reactive to light.   Neck: Normal range of motion. Neck supple.   Cardiovascular: Normal rate and regular rhythm.    No murmur heard.  Pulmonary/Chest: Effort normal and breath sounds normal.   Abdominal: Soft. Bowel sounds are normal. There is no tenderness.   Musculoskeletal: Normal range of motion. She exhibits no edema.   Neurological: She is alert and oriented to person, place, and time.   Mild confusion   Skin: Skin is warm and dry. Capillary refill takes less than 2 seconds.   Psychiatric: She has a normal mood and affect. Her behavior is normal.       Significant Labs:   Labs have been reviewed.    Diagnostic Results:  CT abdomen/pelvis (11/8/17): I have personally reviewed the images  - Findings metastatic disease.  Specifically there are innumerable hypoenhancing lesion seen throughout the hepatic parenchyma and a pancreatic mass.  - Thrombus is seen within the inferior vena cava right at the junction of the inferior vena cava and right atrium.    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  - the positivity observed with cytokeratin 7, high-molecular-weight cytokeratin, cytokeratin 5/6, and GATA3 is suggestive of metastasis of urothelial origin  - although GATA3 can also be positive in breast carcinomas, negativity for ER and NE as well as the unremarkable recent mammogram make this unlikely.  - PAX8 negativity argues against primaries originating from the reproductive tract and kidney.  - cholangiocarcinoma is also a diagnostic consideration

## 2017-11-10 NOTE — ASSESSMENT & PLAN NOTE
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 Likely histo resemblance as  or cholangiocarcinoma as primary.   -- pending oncology final recs

## 2017-11-10 NOTE — SUBJECTIVE & OBJECTIVE
Review of Systems   Constitutional: Positive for activity change, appetite change, fatigue and unexpected weight change. Negative for chills and fever.   HENT: Negative for congestion.    Eyes: Negative for visual disturbance.   Respiratory: Negative for cough and shortness of breath.    Cardiovascular: Negative for chest pain and leg swelling.   Gastrointestinal: Positive for abdominal pain and nausea. Negative for blood in stool, constipation, diarrhea and vomiting.   Endocrine: Negative for polyphagia and polyuria.   Genitourinary: Negative for dysuria, flank pain and hematuria.   Musculoskeletal: Negative for back pain.   Skin: Negative for rash.   Neurological: Positive for dizziness, weakness and light-headedness. Negative for seizures, syncope, speech difficulty, numbness and headaches.   Hematological: Does not bruise/bleed easily.   Psychiatric/Behavioral: Negative for agitation and behavioral problems.     Objective:     Vital Signs (Most Recent):  Temp: 97.9 °F (36.6 °C) (11/10/17 1610)  Pulse: (!) 112 (11/10/17 1610)  Resp: 18 (11/10/17 1610)  BP: 138/71 (11/10/17 1610)  SpO2: 96 % (11/10/17 1610) Vital Signs (24h Range):  Temp:  [96.2 °F (35.7 °C)-99.2 °F (37.3 °C)] 97.9 °F (36.6 °C)  Pulse:  [] 112  Resp:  [17-18] 18  SpO2:  [93 %-98 %] 96 %  BP: (121-138)/(57-83) 138/71     Weight: 49.5 kg (109 lb 1.6 oz)  Body mass index is 18.73 kg/m².    Intake/Output Summary (Last 24 hours) at 11/10/17 1658  Last data filed at 11/09/17 2200   Gross per 24 hour   Intake              220 ml   Output                0 ml   Net              220 ml      Physical Exam   Constitutional: She is oriented to person, place, and time. She appears well-developed and well-nourished.   HENT:   Head: Normocephalic and atraumatic.   Eyes: EOM are normal. Pupils are equal, round, and reactive to light.   Neck: Normal range of motion. Neck supple.   Cardiovascular: Normal rate, regular rhythm and normal heart sounds.    No  murmur heard.  Pulmonary/Chest: Effort normal and breath sounds normal.   Abdominal: Soft. Bowel sounds are normal. There is no tenderness.   Right healed scar with laparoscopic healed scars   Musculoskeletal: Normal range of motion. She exhibits no edema.   Neurological: She is alert and oriented to person, place, and time. She exhibits normal muscle tone.   Gets distracted in the middle of answering the question and forget what was the question, has hard time comprehending questions, does know the president she can describe him but doesn't remember his name   Skin: Skin is warm and dry. Capillary refill takes less than 2 seconds.   Psychiatric: She has a normal mood and affect. Her behavior is normal.       Significant Labs: All pertinent labs within the past 24 hours have been reviewed.

## 2017-11-10 NOTE — PLAN OF CARE
Sw did meet with Pt in the room, Sw provided SNF list of facilities by Pt's zipcode if Pt is interested in facility prior to d/c home. Pt very pleasant and will review list, Sw to follow with choices and medical work up.

## 2017-11-10 NOTE — PLAN OF CARE
Problem: Patient Care Overview  Goal: Plan of Care Review  Pt resting quietly. Pt had an uneventful night last night. VS stable. No complaints at present time. Will continue to monitor patient.

## 2017-11-10 NOTE — CONSULTS
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 She denies weight loss, headache, shortness of breath, chest pain, nausea, vomiting, diarrhea, constipation, melena, hematuria.      Oncology Treatment Plan:   [No treatment plan]    Medications:  Continuous Infusions:   Scheduled Meds:   enoxaparin  1.5 mg/kg Subcutaneous Q24H     PRN Meds:acetaminophen, dextrose 50%, dextrose 50%, dextrose 50%, dextrose 50%, glucagon (human recombinant), glucagon (human recombinant), glucose, glucose, glucose, glucose, metoclopramide HCl, ondansetron, oxyCODONE, senna-docusate 8.6-50 mg     Review of patient's allergies indicates:  No Known Allergies     Past Medical History:   Diagnosis Date    Cancer     kidney    Renal disorder     Tumor liver      Past Surgical History:   Procedure Laterality Date    CHOLECYSTECTOMY      HYSTERECTOMY      kidney removal       Family History     None        Social History Main Topics    Smoking status: Never Smoker    Smokeless tobacco: Never Used    Alcohol use No    Drug use: " No    Sexual activity: Not on file       Review of Systems   Constitutional: Positive for activity change, appetite change, fatigue and unexpected weight change. Negative for chills and fever.   HENT: Negative for congestion.    Eyes: Negative for visual disturbance.   Respiratory: Negative for cough and shortness of breath.    Cardiovascular: Negative for chest pain and leg swelling.   Gastrointestinal: Positive for abdominal pain and nausea. Negative for blood in stool, constipation, diarrhea and vomiting.   Endocrine: Negative for polyphagia and polyuria.   Genitourinary: Negative for dysuria, flank pain and hematuria.   Musculoskeletal: Negative for back pain.   Skin: Negative for rash.   Neurological: Positive for dizziness, weakness and light-headedness. Negative for seizures, syncope, speech difficulty, numbness and headaches.   Hematological: Does not bruise/bleed easily.   Psychiatric/Behavioral: Negative for agitation and behavioral problems.     Objective:     Vital Signs (Most Recent):  Temp: 97.9 °F (36.6 °C) (11/10/17 0750)  Pulse: 101 (11/10/17 0750)  Resp: 17 (11/10/17 0750)  BP: 126/75 (11/10/17 0750)  SpO2: 98 % (11/10/17 0750) Vital Signs (24h Range):  Temp:  [96.2 °F (35.7 °C)-99.2 °F (37.3 °C)] 97.9 °F (36.6 °C)  Pulse:  [100-113] 101  Resp:  [17-18] 17  SpO2:  [93 %-98 %] 98 %  BP: (119-137)/(75-83) 126/75     Weight: 49.5 kg (109 lb 1.6 oz)  Body mass index is 18.73 kg/m².  Body surface area is 1.5 meters squared.      Intake/Output Summary (Last 24 hours) at 11/10/17 0942  Last data filed at 11/09/17 2200   Gross per 24 hour   Intake              320 ml   Output                0 ml   Net              320 ml       Physical Exam   Constitutional: She is oriented to person, place, and time. She appears well-developed and well-nourished.   HENT:   Head: Normocephalic and atraumatic.   Eyes: EOM are normal. Pupils are equal, round, and reactive to light.   Neck: Normal range of motion. Neck  supple.   Cardiovascular: Normal rate and regular rhythm.    No murmur heard.  Pulmonary/Chest: Effort normal and breath sounds normal.   Abdominal: Soft. Bowel sounds are normal. There is no tenderness.   Musculoskeletal: Normal range of motion. She exhibits no edema.   Neurological: She is alert and oriented to person, place, and time.   Mild confusion   Skin: Skin is warm and dry. Capillary refill takes less than 2 seconds.   Psychiatric: She has a normal mood and affect. Her behavior is normal.       Significant Labs:   Labs have been reviewed.    Diagnostic Results:  CT abdomen/pelvis (11/8/17): I have personally reviewed the images  - Findings metastatic disease.  Specifically there are innumerable hypoenhancing lesion seen throughout the hepatic parenchyma and a pancreatic mass.  - Thrombus is seen within the inferior vena cava right at the junction of the inferior vena cava and right atrium.    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  - the positivity observed with cytokeratin 7, high-molecular-weight cytokeratin, cytokeratin 5/6, and GATA3 is suggestive of metastasis of urothelial origin  - although GATA3 can also be positive in breast carcinomas, negativity for ER and MT as well as the unremarkable recent mammogram make this unlikely.  - PAX8 negativity argues against primaries originating from the reproductive tract and kidney.  - cholangiocarcinoma is also a diagnostic consideration    Assessment/Plan:     Cancer  with unknown primary site    - pathology report from liver biopsy suggests a malignancy different from her history of renal cell carcinoma  - at this time, site of origin of her metastatic disease is unknown. Staining was pancytokeratin-positive, which points to carcinoma. It is ALBERTINA 3 positive, which points towards breast or genitourinary primary. Mammogram from 1/20/16 was negative.  - I reviewed the imaging with radiology. No filling defect was seen in left ureter, and no bladder masses were seen as well.  - recommend mammogram  - agree with MRI brain  - she has an elevated INR. Her alkaline phosphatase is mildly elevated. Recommend checking an ammonia to evaluate for hepatic encephalopathy.  - she has iron deficiency anemia. She has had a hysterectomy. Consider checking stool for occult blood.   - consider urine cytology and urology consult.   - if further workup does not reveal a primary site, she can be treated as a metastatic cancer of unknown primary. A reasonable treatment regimen would be gemcitabine/cisplatin: gemcitabine 1250mg/m2 IV days 1,8 and cisplatin 100 mg/m2 IV day 1; repeat every 3 weeks.  - she is a patient of hematologist/oncologist Dr. Trinidad. She has a follow-up appointment scheduled for 11/20/17. Patient has Medicaid, so her follow-up care should be within the [facility] system.          Thank you for your consult. I will follow-up with patient. Please contact us if you have any additional questions.    Ciaran Palomares MD  Hematology/Oncology  [facility]

## 2017-11-10 NOTE — PLAN OF CARE
Problem: Patient Care Overview  Goal: Plan of Care Review  Outcome: Ongoing (interventions implemented as appropriate)  Recommendations     Recommendation/Intervention: 1. Continue w/ Regular 2g diet. If po intake continues to be < 25%  by f/u visit, consider alt means of nutrition, will reassess at that time  Goals: PO intake >=50% EEN/EPN  by f/u visit  Nutrition Goal Status: new  Communication of RD Recs: reviewed with RN (informed me of poor po intake and preference for fruit)    Assessment and Plan         Mild malnutrition     Related to (etiology):   Decreased appetite/ intake, RD suspects Anorexia      Signs and Symptoms (as evidenced by):   PO intake 0-25%, pt very reluctant to speak about food (see comments above)     Interventions/Recommendations (treatment strategy):  Consult Psychology to inquire farther about eating disorder  See recs above

## 2017-11-10 NOTE — ASSESSMENT & PLAN NOTE
- most likely due to dehydration from poor oral intake, as her power on examination was normal but pt still feels weak and has not been ambulating.  - Pt/OT consulted, SNF recommended

## 2017-11-10 NOTE — HPI
"62 year-old female with history of right renal cell carcinoma status post nephrectomy (2016), liver/pancreatic masses was admitted on 11/8/17 for acute encephalopathy and workup of likely metastatic disease. 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

## 2017-11-11 LAB
BASOPHILS # BLD AUTO: 0.02 K/UL
BASOPHILS NFR BLD: 0.1 %
DIFFERENTIAL METHOD: ABNORMAL
EOSINOPHIL # BLD AUTO: 0.3 K/UL
EOSINOPHIL NFR BLD: 1.8 %
ERYTHROCYTE [DISTWIDTH] IN BLOOD BY AUTOMATED COUNT: 15.5 %
HCT VFR BLD AUTO: 24.7 %
HGB BLD-MCNC: 8.1 G/DL
IMM GRANULOCYTES # BLD AUTO: 0.3 K/UL
IMM GRANULOCYTES NFR BLD AUTO: 1.6 %
LYMPHOCYTES # BLD AUTO: 1.3 K/UL
LYMPHOCYTES NFR BLD: 6.8 %
MCH RBC QN AUTO: 23.6 PG
MCHC RBC AUTO-ENTMCNC: 32.8 G/DL
MCV RBC AUTO: 72 FL
MONOCYTES # BLD AUTO: 1.7 K/UL
MONOCYTES NFR BLD: 9 %
NEUTROPHILS # BLD AUTO: 15.4 K/UL
NEUTROPHILS NFR BLD: 80.7 %
NRBC BLD-RTO: 0 /100 WBC
PLATELET # BLD AUTO: 181 K/UL
PMV BLD AUTO: 11.6 FL
RBC # BLD AUTO: 3.43 M/UL
WBC # BLD AUTO: 19.03 K/UL

## 2017-11-11 PROCEDURE — 11000001 HC ACUTE MED/SURG PRIVATE ROOM

## 2017-11-11 PROCEDURE — 36415 COLL VENOUS BLD VENIPUNCTURE: CPT

## 2017-11-11 PROCEDURE — 97116 GAIT TRAINING THERAPY: CPT

## 2017-11-11 PROCEDURE — 63600175 PHARM REV CODE 636 W HCPCS: Performed by: INTERNAL MEDICINE

## 2017-11-11 PROCEDURE — 97110 THERAPEUTIC EXERCISES: CPT

## 2017-11-11 PROCEDURE — 85025 COMPLETE CBC W/AUTO DIFF WBC: CPT

## 2017-11-11 PROCEDURE — 99231 SBSQ HOSP IP/OBS SF/LOW 25: CPT | Mod: ,,, | Performed by: INTERNAL MEDICINE

## 2017-11-11 RX ADMIN — ENOXAPARIN SODIUM 70 MG: 100 INJECTION SUBCUTANEOUS at 11:11

## 2017-11-11 NOTE — PT/OT/SLP PROGRESS
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  Patient states " I just get tired easily, I don't know why".    Pain/Comfort  Pain Rating 1: 0/10  Pain Rating Post-Intervention 1: 0/10    Objective:   Patient found with: telemetry    Functional Mobility:  Bed Mobility:   Rolling/Turning to Left: Modified independent, With side rail  Scooting/Bridging: Modified Independent  Supine to Sit: Supervision, With side rail  Sit to Supine: Supervision, With siderail    Transfers:  Bed <> Chair Technique: Stand Pivot  Bed <> Chair Assistance: Contact Guard Assistance  Bed <> Chair Assistive Device: No Assistive Device    Gait:   Gait Distance: 120 ft with cues to correct posture and RW management.  Assistance 1: Contact Guard Assistance  Gait Assistive Device: Rolling walker  Gait Pattern: reciprocal  Gait Deviation(s): decreased jayden, decreased velocity of limb motion, decreased step length, decreased stride length    Stairs:      Balance:   Static Sit: GOOD+: Takes MAXIMAL challenges from all directions.    Dynamic Sit: GOOD: Maintains balance through MODERATE excursions of active trunk movement  Static Stand: FAIR+: Takes MINIMAL challenges from all directions  Dynamic stand: FAIR: Needs CONTACT GUARD during gait     Therapeutic Activities and Exercises:  Donned/Doffed a gown. There ex in sitting: LAQ, HIP FLEX AND HEEL/TOE RAISES 2X15 REPS B LE with rest breaks between sets.     AM-PAC 6 CLICK MOBILITY  How much help from another person does " this patient currently need?   1 = Unable, Total/Dependent Assistance  2 = A lot, Maximum/Moderate Assistance  3 = A little, Minimum/Contact Guard/Supervision  4 = None, Modified [place]/Independent    Turning over in bed (including adjusting bedclothes, sheets and blankets)?: 4  Sitting down on and standing up from a chair with arms (e.g., wheelchair, bedside commode, etc.): 3  Moving from lying on back to sitting on the side of the bed?: 4  Moving to and from a bed to a chair (including a wheelchair)?: 3  Need to walk in hospital room?: 3  Climbing 3-5 steps with a railing?: 2  Total Score: 19    AM-PAC Raw Score CMS G-Code Modifier Level of Impairment Assistance   6 % Total / Unable   7 - 9 CM 80 - 100% Maximal Assist   10 - 14 CL 60 - 80% Moderate Assist   15 - 19 CK 40 - 60% Moderate Assist   20 - 22 CJ 20 - 40% Minimal Assist   23 CI 1-20% SBA / CGA   24 CH 0% Independent/ Mod I     Patient left HOB elevated with all lines intact and call button in reach.    Assessment:  Yao Chapa is a 62 y.o. 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 Patient fatigued easily with exertion and required coaxing. No loss of balance or safety issues noted. Patient would benefit from continued P.T. To address deficits.    Rehab identified problem list/impairments: Rehab identified problem list/impairments: weakness, impaired endurance, gait instability, decreased safety awareness    Rehab potential is good.    Activity tolerance: Fair    Discharge recommendations: Discharge Facility/Level Of Care Needs: nursing facility, skilled     Barriers to discharge: Barriers to Discharge: Inaccessible home environment, Decreased caregiver support    Equipment recommendations: Equipment Needed After Discharge: bath bench, walker, rolling     GOALS:    Physical Therapy Goals        Problem: Physical Therapy Goal     Goal Priority Disciplines Outcome Goal Variances Interventions   Physical Therapy Goal     PT/OT, PT Ongoing (interventions implemented as appropriate)     Description:  Goals to be met by: 17     Patient will increase functional independence with mobility by performin. Supine to sit with Stand-by Assistance- MET  2. Sit to supine with Stand-by Assistance- MET  3. Sit to stand transfer with Stand-by Assistance   4. Bed to chair transfer with Stand-by Assistance using Rolling Walker   5. Gait  x 100 feet with Stand-by Assistance using Rolling Walker.   6. Ascend/descend 4 stair with no Handrails Contact Guard Assistance  7. Lower extremity exercise program x15 reps per handout, with independence                       PLAN:    Patient to be seen 4 x/week  to address the above listed problems via gait training, therapeutic activities, therapeutic exercises, neuromuscular re-education  Plan of Care expires: 17  Plan of Care reviewed with: patient         Carlos aPndya, PTA  2017

## 2017-11-11 NOTE — PLAN OF CARE
Problem: Physical Therapy Goal  Goal: Physical Therapy Goal  Goals to be met by: 17     Patient will increase functional independence with mobility by performin. Supine to sit with Stand-by Assistance- MET  2. Sit to supine with Stand-by Assistance- MET  3. Sit to stand transfer with Stand-by Assistance   4. Bed to chair transfer with Stand-by Assistance using Rolling Walker   5. Gait  x 100 feet with Stand-by Assistance using Rolling Walker.   6. Ascend/descend 4 stair with no Handrails Contact Guard Assistance  7. Lower extremity exercise program x15 reps per handout, with independence     Outcome: Ongoing (interventions implemented as appropriate)  Overall strength has improved, but endurance continues to limit independence.

## 2017-11-11 NOTE — PLAN OF CARE
Problem: Patient Care Overview  Goal: Plan of Care Review  Outcome: Ongoing (interventions implemented as appropriate)  Alert oriented to self forgetful cant remember the date and situations , ivf bolus 1000cc given  Wbc 18.71 afebrile vs stable mri brain done skin intact no injury denies any pain or discomfort no sob  free pf falls , attempted to teach pt on lovenox injectiobn  But unable to comprehend , plan of care reviewed with pt.

## 2017-11-11 NOTE — ASSESSMENT & PLAN NOTE
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 Likely histo resemblance as Gu/breast (based on GATA3+) or cholangiocarcinoma as primary.   -- Hem/Onc following.

## 2017-11-11 NOTE — SUBJECTIVE & OBJECTIVE
Interval History: NAEON. Vitals stable.    Review of Systems   Unable to perform ROS: Other   Constitutional: Positive for activity change, appetite change, fatigue and unexpected weight change. Negative for chills and fever.   Respiratory: Negative for shortness of breath.    Cardiovascular: Negative for chest pain.   Gastrointestinal: Positive for nausea. Negative for abdominal pain, diarrhea and vomiting.   Neurological: Negative for dizziness and light-headedness.   Psychiatric/Behavioral: Positive for confusion. Negative for agitation.     Objective:     Vital Signs (Most Recent):  Temp: 97.6 °F (36.4 °C) (11/11/17 1156)  Pulse: 105 (11/11/17 1156)  Resp: 18 (11/11/17 1156)  BP: 131/81 (11/11/17 1156)  SpO2: 95 % (11/11/17 1156) Vital Signs (24h Range):  Temp:  [97.6 °F (36.4 °C)-99.5 °F (37.5 °C)] 97.6 °F (36.4 °C)  Pulse:  [] 105  Resp:  [18-20] 18  SpO2:  [92 %-96 %] 95 %  BP: (116-138)/(66-81) 131/81     Weight: 49.5 kg (109 lb 1.6 oz)  Body mass index is 18.73 kg/m².  No intake or output data in the 24 hours ending 11/11/17 1350   Physical Exam   Constitutional: No distress.   Eyes: Pupils are equal, round, and reactive to light.   Cardiovascular: Normal rate and regular rhythm.    Pulmonary/Chest: Effort normal and breath sounds normal.   Abdominal: Soft. Bowel sounds are normal.   Musculoskeletal: She exhibits no edema.   Neurological: She is alert.   Oriented x1       Significant Labs:   A1C:   Recent Labs  Lab 11/09/17  0015 11/09/17  0805   HGBA1C 5.3 5.3     ABGs: No results for input(s): PH, PCO2, HCO3, POCSATURATED, BE, TOTALHB, COHB, METHB, O2HB, POCFIO2 in the last 48 hours.  Bilirubin:   Recent Labs  Lab 11/08/17  1758 11/09/17  0437   BILITOT 0.8 0.6     Blood Culture: No results for input(s): LABBLOO in the last 48 hours.  BMP: No results for input(s): GLU, NA, K, CL, CO2, BUN, CREATININE, CALCIUM, MG in the last 48 hours.  CBC:   Recent Labs  Lab 11/10/17  0407 11/11/17  0440   WBC  18.71* 19.03*   HGB 8.5* 8.1*   HCT 26.8* 24.7*    181     CMP: No results for input(s): NA, K, CL, CO2, GLU, BUN, CREATININE, CALCIUM, PROT, ALBUMIN, BILITOT, ALKPHOS, AST, ALT, ANIONGAP, EGFRNONAA in the last 48 hours.    Invalid input(s): ESTGFAFRICA  Cardiac Markers: No results for input(s): CKMB, MYOGLOBIN, BNP, TROPISTAT in the last 48 hours.  Coagulation:   Recent Labs  Lab 11/10/17  0801   INR 1.9*     Lactic Acid: No results for input(s): LACTATE in the last 48 hours.  Lipase: No results for input(s): LIPASE in the last 48 hours.  Lipid Panel: No results for input(s): CHOL, HDL, LDLCALC, TRIG, CHOLHDL in the last 48 hours.  Magnesium: No results for input(s): MG in the last 48 hours.  Pathology Results  (Last 10 years)    None        POCT Glucose: No results for input(s): POCTGLUCOSE in the last 48 hours.  Prealbumin: No results for input(s): PREALBUMIN in the last 48 hours.  Respiratory Culture: No results for input(s): GSRESP, RESPIRATORYC in the last 48 hours.  Troponin: No results for input(s): TROPONINI in the last 48 hours.  TSH: No results for input(s): TSH in the last 4320 hours.  Urine Culture: No results for input(s): LABURIN in the last 48 hours.  Urine Studies: No results for input(s): COLORU, APPEARANCEUA, PHUR, SPECGRAV, PROTEINUA, GLUCUA, KETONESU, BILIRUBINUA, OCCULTUA, NITRITE, UROBILINOGEN, LEUKOCYTESUR, RBCUA, WBCUA, BACTERIA, SQUAMEPITHEL, HYALINECASTS in the last 48 hours.    Invalid input(s): BENJI  All pertinent labs within the past 24 hours have been reviewed.    Significant Imaging: I have reviewed all pertinent imaging results/findings within the past 24 hours.

## 2017-11-11 NOTE — PLAN OF CARE
Problem: Patient Care Overview  Goal: Plan of Care Review  Outcome: Ongoing (interventions implemented as appropriate)  Pt remains free from falls or injury. Pt makes statement of no pain. Bed low and locked, Call light within reach.

## 2017-11-11 NOTE — PLAN OF CARE
Problem: Patient Care Overview  Goal: Plan of Care Review  Outcome: Ongoing (interventions implemented as appropriate)  POC reviewed with pt and family. AAO x2. Urine sample pending. Pt remained free from falls. Questions and concerns addressed. Pt progressing towards goals. Will continue to monitor. See flow sheets for full assessment and VS

## 2017-11-11 NOTE — ASSESSMENT & PLAN NOTE
- S/p right nephrectomy 11/2016 in [facility].  - Liver/pancreatic mass s/p biopsy 6/17 at [facility].  - CT abdomen 11/9: there are innumerable hypoenhancing lesion seen throughout the hepatic parenchyma and a pancreatic mass.Thrombus is seen within the inferior vena cava right at the junction of the inferior vena cava and right atrium.  - MRI brain shows no evidence of mets  - On Lovenox 70mg q24.  - Medical records from [facility] show liver biopsy consistent with carcinoma, GATA3+, but the morphology and immunohistochemical profile are not consistent with metastasis from her renal tumor.  - Hem/Onc consulted, appreciate recs.  *recommend urine cytology, FOBT.

## 2017-11-11 NOTE — ASSESSMENT & PLAN NOTE
"- SIRS 2/4 "tachycardia and leucocytosis", improving WBC trends.   - Pt has no obvious source of infection.  - Blood Cx NGTD, UA is negative  - keep off Abx  "

## 2017-11-11 NOTE — PROGRESS NOTES
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 Presented to ED with weakness, decreased appetite, nausea and abdominal discomfort.    She  Has been in her usual state of health until 4 days ago when she became more sleepy eating less and feels drained. She reports some nausea and RLQ pain that is more discomfort "gas", pain was improved by OTC medication, She state that she didn't have BM for 4 days"possiblly due to her not eating much" she had lost 10-15 lb in couple of months. Denies any vomiting any bleeding per rectum, any dysuria, fever, or chills, no hx of seizures. She and her sister jono pt ever been on chemotherapy or had radiation. She is not on any medication currently.  Pt is following at [facility] where she did the surgery, she has appointment on the 20th nov for follow up on her biopsy results but she prefers here.    Her memory problems started before the nephrectomy, where she forgets things, people but always were oriented.    Hospital Course:  In The ED given "ceftrixone, vancomycin and metronidazole", I L Nacl, CT abdomen, head and CXR done with labs.    11/10: still tired with mild memory impairments, tachycardic on exam. consulted hem onc. And pending MRI brain final reads. Working with PT/OT, need SNF.       11/11: Stable. Still disoriented to place and time. MRI brain shows no evidence of mets. Ammonia WNL.     Interval History: NAEON. " Vitals stable.    Review of Systems   Unable to perform ROS: Other   Constitutional: Positive for activity change, appetite change, fatigue and unexpected weight change. Negative for chills and fever.   Respiratory: Negative for shortness of breath.    Cardiovascular: Negative for chest pain.   Gastrointestinal: Positive for nausea. Negative for abdominal pain, diarrhea and vomiting.   Neurological: Negative for dizziness and light-headedness.   Psychiatric/Behavioral: Positive for confusion. Negative for agitation.     Objective:     Vital Signs (Most Recent):  Temp: 97.6 °F (36.4 °C) (11/11/17 1156)  Pulse: 105 (11/11/17 1156)  Resp: 18 (11/11/17 1156)  BP: 131/81 (11/11/17 1156)  SpO2: 95 % (11/11/17 1156) Vital Signs (24h Range):  Temp:  [97.6 °F (36.4 °C)-99.5 °F (37.5 °C)] 97.6 °F (36.4 °C)  Pulse:  [] 105  Resp:  [18-20] 18  SpO2:  [92 %-96 %] 95 %  BP: (116-138)/(66-81) 131/81     Weight: 49.5 kg (109 lb 1.6 oz)  Body mass index is 18.73 kg/m².  No intake or output data in the 24 hours ending 11/11/17 1350   Physical Exam   Constitutional: No distress.   Eyes: Pupils are equal, round, and reactive to light.   Cardiovascular: Normal rate and regular rhythm.    Pulmonary/Chest: Effort normal and breath sounds normal.   Abdominal: Soft. Bowel sounds are normal.   Musculoskeletal: She exhibits no edema.   Neurological: She is alert.   Oriented x1       Significant Labs:   A1C:   Recent Labs  Lab 11/09/17  0015 11/09/17  0805   HGBA1C 5.3 5.3     ABGs: No results for input(s): PH, PCO2, HCO3, POCSATURATED, BE, TOTALHB, COHB, METHB, O2HB, POCFIO2 in the last 48 hours.  Bilirubin:   Recent Labs  Lab 11/08/17  1758 11/09/17  0437   BILITOT 0.8 0.6     Blood Culture: No results for input(s): LABBLOO in the last 48 hours.  BMP: No results for input(s): GLU, NA, K, CL, CO2, BUN, CREATININE, CALCIUM, MG in the last 48 hours.  CBC:   Recent Labs  Lab 11/10/17  0407 11/11/17  0440   WBC 18.71* 19.03*   HGB 8.5* 8.1*    HCT 26.8* 24.7*    181     CMP: No results for input(s): NA, K, CL, CO2, GLU, BUN, CREATININE, CALCIUM, PROT, ALBUMIN, BILITOT, ALKPHOS, AST, ALT, ANIONGAP, EGFRNONAA in the last 48 hours.    Invalid input(s): ESTGFAFRICA  Cardiac Markers: No results for input(s): CKMB, MYOGLOBIN, BNP, TROPISTAT in the last 48 hours.  Coagulation:   Recent Labs  Lab 11/10/17  0801   INR 1.9*     Lactic Acid: No results for input(s): LACTATE in the last 48 hours.  Lipase: No results for input(s): LIPASE in the last 48 hours.  Lipid Panel: No results for input(s): CHOL, HDL, LDLCALC, TRIG, CHOLHDL in the last 48 hours.  Magnesium: No results for input(s): MG in the last 48 hours.  Pathology Results  (Last 10 years)    None        POCT Glucose: No results for input(s): POCTGLUCOSE in the last 48 hours.  Prealbumin: No results for input(s): PREALBUMIN in the last 48 hours.  Respiratory Culture: No results for input(s): GSRESP, RESPIRATORYC in the last 48 hours.  Troponin: No results for input(s): TROPONINI in the last 48 hours.  TSH: No results for input(s): TSH in the last 4320 hours.  Urine Culture: No results for input(s): LABURIN in the last 48 hours.  Urine Studies: No results for input(s): COLORU, APPEARANCEUA, PHUR, SPECGRAV, PROTEINUA, GLUCUA, KETONESU, BILIRUBINUA, OCCULTUA, NITRITE, UROBILINOGEN, LEUKOCYTESUR, RBCUA, WBCUA, BACTERIA, SQUAMEPITHEL, HYALINECASTS in the last 48 hours.    Invalid input(s): WRIGHTSUR  All pertinent labs within the past 24 hours have been reviewed.    Significant Imaging: I have reviewed all pertinent imaging results/findings within the past 24 hours.    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  - Liver/pancreatic mass s/p biopsy 6/17 at [facility].  - CT abdomen 11/9: there are innumerable hypoenhancing lesion seen throughout the hepatic parenchyma and a pancreatic mass.Thrombus is seen within the inferior vena  "cava right at the junction of the inferior vena cava and right atrium.  - MRI brain shows no evidence of mets  - On Lovenox 70mg q24.  - Medical records from [facility] show liver biopsy consistent with carcinoma, GATA3+, but the morphology and immunohistochemical profile are not consistent with metastasis from her renal tumor.  - Hem/Onc consulted, appreciate recs.  *recommend urine cytology, FOBT.          Cancer with unknown primary site    -- by [facility] path   -- consulted hem /onc  -- no signs of obstruction.           Weakness    - most likely due to dehydration from poor oral intake, as her power on examination was normal but pt still feels weak and has not been ambulating.  - Pt/OT consulted, SNF recommended           Mild malnutrition    - Poor oral intake.  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    - S/p right nephrectomy in 2016          Mass of pancreas              Liver masses    - she had Carcinoma of unknown origin as per path reports. Likely histo resemblance as Gu/breast (based on GATA3+) or cholangiocarcinoma as primary.   -- Hem/Onc following.        Microcytic anemia    - Most likely iron deficiency anemia due to malnuration  - FOBT pending.            Leukocytosis    - SIRS 2/4 "tachycardia and leucocytosis", improving WBC trends.   - Pt has no obvious source of infection.  - Blood Cx NGTD, UA is negative  - keep off Abx          VTE Risk Mitigation         Ordered     enoxaparin injection 70 mg  Every 24 hours (non-standard times)     Route:  Subcutaneous        11/09/17 1124     High Risk of VTE  Once      11/09/17 0739     Place sequential compression device  Until discontinued      11/09/17 0739     Place sequential compression device  Until discontinued      11/08/17 2200              Mamadou Cabral MD  Department of Hospital " Medicine   [facility]

## 2017-11-12 LAB
BASOPHILS # BLD AUTO: 0.02 K/UL
BASOPHILS NFR BLD: 0.1 %
DIFFERENTIAL METHOD: ABNORMAL
EOSINOPHIL # BLD AUTO: 0.4 K/UL
EOSINOPHIL NFR BLD: 2.2 %
ERYTHROCYTE [DISTWIDTH] IN BLOOD BY AUTOMATED COUNT: 15.4 %
HCT VFR BLD AUTO: 25 %
HGB BLD-MCNC: 7.8 G/DL
IMM GRANULOCYTES # BLD AUTO: 0.21 K/UL
IMM GRANULOCYTES NFR BLD AUTO: 1.2 %
LYMPHOCYTES # BLD AUTO: 1.3 K/UL
LYMPHOCYTES NFR BLD: 7.6 %
MCH RBC QN AUTO: 22.9 PG
MCHC RBC AUTO-ENTMCNC: 31.2 G/DL
MCV RBC AUTO: 74 FL
MONOCYTES # BLD AUTO: 1.7 K/UL
MONOCYTES NFR BLD: 9.5 %
NEUTROPHILS # BLD AUTO: 14.1 K/UL
NEUTROPHILS NFR BLD: 79.4 %
NRBC BLD-RTO: 0 /100 WBC
PLATELET # BLD AUTO: 189 K/UL
PMV BLD AUTO: 11.7 FL
RBC # BLD AUTO: 3.4 M/UL
TB INDURATION 48 - 72 HR READ: 0 MM
WBC # BLD AUTO: 17.73 K/UL

## 2017-11-12 PROCEDURE — 63600175 PHARM REV CODE 636 W HCPCS: Performed by: INTERNAL MEDICINE

## 2017-11-12 PROCEDURE — 99231 SBSQ HOSP IP/OBS SF/LOW 25: CPT | Mod: ,,, | Performed by: INTERNAL MEDICINE

## 2017-11-12 PROCEDURE — 85025 COMPLETE CBC W/AUTO DIFF WBC: CPT

## 2017-11-12 PROCEDURE — 25000003 PHARM REV CODE 250: Performed by: STUDENT IN AN ORGANIZED HEALTH CARE EDUCATION/TRAINING PROGRAM

## 2017-11-12 PROCEDURE — 36415 COLL VENOUS BLD VENIPUNCTURE: CPT

## 2017-11-12 PROCEDURE — 63600175 PHARM REV CODE 636 W HCPCS: Performed by: STUDENT IN AN ORGANIZED HEALTH CARE EDUCATION/TRAINING PROGRAM

## 2017-11-12 PROCEDURE — 11000001 HC ACUTE MED/SURG PRIVATE ROOM

## 2017-11-12 RX ADMIN — OXYCODONE HYDROCHLORIDE 5 MG: 5 TABLET ORAL at 12:11

## 2017-11-12 RX ADMIN — ONDANSETRON 4 MG: 2 INJECTION INTRAMUSCULAR; INTRAVENOUS at 12:11

## 2017-11-12 RX ADMIN — ENOXAPARIN SODIUM 70 MG: 100 INJECTION SUBCUTANEOUS at 11:11

## 2017-11-12 NOTE — PLAN OF CARE
Problem: Patient Care Overview  Goal: Plan of Care Review  Outcome: Ongoing (interventions implemented as appropriate)  Pt  A-O x2-3 forgets time and situation. Remains free of falls and injury. Pt makes multiple statements of no pain. Bed low and locked, Call light within reach.

## 2017-11-12 NOTE — PLAN OF CARE
Problem: Patient Care Overview  Goal: Plan of Care Review  Outcome: Ongoing (interventions implemented as appropriate)  Waiting on placement for rehab, oriented x4 with periods of confusion    Problem: Fall Risk (Adult)  Goal: Absence of Falls  Patient will demonstrate the desired outcomes by discharge/transition of care.   Outcome: Ongoing (interventions implemented as appropriate)   11/12/17 1538   Fall Risk (Adult)   Absence of Falls making progress toward outcome

## 2017-11-12 NOTE — ASSESSMENT & PLAN NOTE
-- by [facility] path   -- consulted hem /onc  -- no signs of obstruction.

## 2017-11-12 NOTE — SUBJECTIVE & OBJECTIVE
Interval History: NAEON. Vitals stable.    Review of Systems   Constitutional: Positive for appetite change. Negative for chills and fever.   Eyes: Negative for visual disturbance.   Respiratory: Negative for shortness of breath.    Cardiovascular: Negative for chest pain.   Gastrointestinal: Negative for abdominal pain, diarrhea and vomiting.   Genitourinary: Negative for difficulty urinating.   Skin: Negative for pallor.   Neurological: Negative for dizziness and light-headedness.   Psychiatric/Behavioral: Negative for agitation and confusion.     Objective:     Vital Signs (Most Recent):  Temp: 98.6 °F (37 °C) (11/12/17 0834)  Pulse: 102 (11/12/17 0834)  Resp: 17 (11/12/17 0834)  BP: 129/64 (11/12/17 0834)  SpO2: 96 % (11/12/17 0834) Vital Signs (24h Range):  Temp:  [97.6 °F (36.4 °C)-99.1 °F (37.3 °C)] 98.6 °F (37 °C)  Pulse:  [] 102  Resp:  [16-18] 17  SpO2:  [93 %-97 %] 96 %  BP: (110-131)/(64-81) 129/64     Weight: 49.5 kg (109 lb 1.6 oz)  Body mass index is 18.73 kg/m².  No intake or output data in the 24 hours ending 11/12/17 1009   Physical Exam   Constitutional: She is oriented to person, place, and time. No distress.   Eyes: Pupils are equal, round, and reactive to light.   Cardiovascular: Normal rate, regular rhythm, normal heart sounds and intact distal pulses.    Pulmonary/Chest: Effort normal and breath sounds normal.   Abdominal: Soft. Bowel sounds are normal.   Musculoskeletal: She exhibits no edema.   Neurological: She is alert and oriented to person, place, and time.       Significant Labs:   A1C:   Recent Labs  Lab 11/09/17  0015 11/09/17  0805   HGBA1C 5.3 5.3     ABGs: No results for input(s): PH, PCO2, HCO3, POCSATURATED, BE, TOTALHB, COHB, METHB, O2HB, POCFIO2 in the last 48 hours.  Bilirubin:   Recent Labs  Lab 11/08/17  1758 11/09/17  0437   BILITOT 0.8 0.6     Blood Culture: No results for input(s): LABBLOO in the last 48 hours.  BMP: No results for input(s): GLU, NA, K, CL, CO2,  BUN, CREATININE, CALCIUM, MG in the last 48 hours.  CBC:   Recent Labs  Lab 11/11/17  0440 11/12/17  0352   WBC 19.03* 17.73*   HGB 8.1* 7.8*   HCT 24.7* 25.0*    189     CMP: No results for input(s): NA, K, CL, CO2, GLU, BUN, CREATININE, CALCIUM, PROT, ALBUMIN, BILITOT, ALKPHOS, AST, ALT, ANIONGAP, EGFRNONAA in the last 48 hours.    Invalid input(s): ESTGFAFRICA  Cardiac Markers: No results for input(s): CKMB, MYOGLOBIN, BNP, TROPISTAT in the last 48 hours.  Coagulation: No results for input(s): INR, APTT in the last 48 hours.    Invalid input(s): PT  Lactic Acid: No results for input(s): LACTATE in the last 48 hours.  Lipase: No results for input(s): LIPASE in the last 48 hours.  Lipid Panel: No results for input(s): CHOL, HDL, LDLCALC, TRIG, CHOLHDL in the last 48 hours.  Magnesium: No results for input(s): MG in the last 48 hours.  Pathology Results  (Last 10 years)    None        POCT Glucose: No results for input(s): POCTGLUCOSE in the last 48 hours.  Prealbumin: No results for input(s): PREALBUMIN in the last 48 hours.  Respiratory Culture: No results for input(s): GSRESP, RESPIRATORYC in the last 48 hours.  Troponin: No results for input(s): TROPONINI in the last 48 hours.  TSH: No results for input(s): TSH in the last 4320 hours.  Urine Culture: No results for input(s): LABURIN in the last 48 hours.  Urine Studies: No results for input(s): COLORU, APPEARANCEUA, PHUR, SPECGRAV, PROTEINUA, GLUCUA, KETONESU, BILIRUBINUA, OCCULTUA, NITRITE, UROBILINOGEN, LEUKOCYTESUR, RBCUA, WBCUA, BACTERIA, SQUAMEPITHEL, HYALINECASTS in the last 48 hours.    Invalid input(s): WRIGHTSUR  All pertinent labs within the past 24 hours have been reviewed.    Significant Imaging: I have reviewed all pertinent imaging results/findings within the past 24 hours.

## 2017-11-12 NOTE — ASSESSMENT & PLAN NOTE
- S/p right nephrectomy 11/2016 in [facility].  - Liver/pancreatic mass s/p biopsy 6/17 at [facility].  - CT abdomen 11/9: there are innumerable hypoenhancing lesion seen throughout the hepatic parenchyma and a pancreatic mass.Thrombus is seen within the inferior vena cava right at the junction of the inferior vena cava and right atrium.  - MRI brain shows no evidence of mets  - On Lovenox 70mg q24.  - Medical records from [facility] show liver biopsy consistent with carcinoma, GATA3+, but the morphology and immunohistochemical profile are not consistent with metastasis from her renal tumor.  - Hem/Onc consulted, appreciate recs.  - Recommended f/u withing [facility] system.  -  Urine cytology, FOBT pending.

## 2017-11-12 NOTE — PROGRESS NOTES
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 Presented to ED with weakness, decreased appetite, nausea and abdominal discomfort.    She  Has been in her usual state of health until 4 days ago when she became more sleepy eating less and feels drained. She reports some nausea and RLQ pain that is more discomfort "gas", pain was improved by OTC medication, She state that she didn't have BM for 4 days"possiblly due to her not eating much" she had lost 10-15 lb in couple of months. Denies any vomiting any bleeding per rectum, any dysuria, fever, or chills, no hx of seizures. She and her sister jono pt ever been on chemotherapy or had radiation. She is not on any medication currently.  Pt is following at [facility] where she did the surgery, she has appointment on the 20th nov for follow up on her biopsy results but she prefers here.    Her memory problems started before the nephrectomy, where she forgets things, people but always were oriented.    Hospital Course:  In The ED given "ceftrixone, vancomycin and metronidazole", I L Nacl, CT abdomen, head and CXR done with labs.    11/10: still tired with mild memory impairments, tachycardic on exam. consulted hem onc. And pending MRI brain final reads. Working with PT/OT, need SNF.       11/11: Stable. Still disoriented to place and time. MRI brain shows no evidence of mets. Ammonia WNL.     11/12: Stable. Oriented " to person and place. Pending SNF placement. Hem/ Onc recommending f/u with [facility].             Will update family regarding plan of care and follow-up.    Interval History: NAEON. Vitals stable.    Review of Systems   Constitutional: Positive for appetite change. Negative for chills and fever.   Eyes: Negative for visual disturbance.   Respiratory: Negative for shortness of breath.    Cardiovascular: Negative for chest pain.   Gastrointestinal: Negative for abdominal pain, diarrhea and vomiting.   Genitourinary: Negative for difficulty urinating.   Skin: Negative for pallor.   Neurological: Negative for dizziness and light-headedness.   Psychiatric/Behavioral: Negative for agitation and confusion.     Objective:     Vital Signs (Most Recent):  Temp: 98.6 °F (37 °C) (11/12/17 0834)  Pulse: 102 (11/12/17 0834)  Resp: 17 (11/12/17 0834)  BP: 129/64 (11/12/17 0834)  SpO2: 96 % (11/12/17 0834) Vital Signs (24h Range):  Temp:  [97.6 °F (36.4 °C)-99.1 °F (37.3 °C)] 98.6 °F (37 °C)  Pulse:  [] 102  Resp:  [16-18] 17  SpO2:  [93 %-97 %] 96 %  BP: (110-131)/(64-81) 129/64     Weight: 49.5 kg (109 lb 1.6 oz)  Body mass index is 18.73 kg/m².  No intake or output data in the 24 hours ending 11/12/17 1009   Physical Exam   Constitutional: She is oriented to person, place, and time. No distress.   Eyes: Pupils are equal, round, and reactive to light.   Cardiovascular: Normal rate, regular rhythm, normal heart sounds and intact distal pulses.    Pulmonary/Chest: Effort normal and breath sounds normal.   Abdominal: Soft. Bowel sounds are normal.   Musculoskeletal: She exhibits no edema.   Neurological: She is alert and oriented to person, place, and time.       Significant Labs:   A1C:   Recent Labs  Lab 11/09/17  0015 11/09/17  0805   HGBA1C 5.3 5.3     ABGs: No results for input(s): PH, PCO2, HCO3, POCSATURATED, BE, TOTALHB, COHB, METHB, O2HB, POCFIO2 in the last 48 hours.  Bilirubin:   Recent Labs  Lab 11/08/17  7051  11/09/17  0437   BILITOT 0.8 0.6     Blood Culture: No results for input(s): LABBLOO in the last 48 hours.  BMP: No results for input(s): GLU, NA, K, CL, CO2, BUN, CREATININE, CALCIUM, MG in the last 48 hours.  CBC:   Recent Labs  Lab 11/11/17  0440 11/12/17  0352   WBC 19.03* 17.73*   HGB 8.1* 7.8*   HCT 24.7* 25.0*    189     CMP: No results for input(s): NA, K, CL, CO2, GLU, BUN, CREATININE, CALCIUM, PROT, ALBUMIN, BILITOT, ALKPHOS, AST, ALT, ANIONGAP, EGFRNONAA in the last 48 hours.    Invalid input(s): ESTGFAFRICA  Cardiac Markers: No results for input(s): CKMB, MYOGLOBIN, BNP, TROPISTAT in the last 48 hours.  Coagulation: No results for input(s): INR, APTT in the last 48 hours.    Invalid input(s): PT  Lactic Acid: No results for input(s): LACTATE in the last 48 hours.  Lipase: No results for input(s): LIPASE in the last 48 hours.  Lipid Panel: No results for input(s): CHOL, HDL, LDLCALC, TRIG, CHOLHDL in the last 48 hours.  Magnesium: No results for input(s): MG in the last 48 hours.  Pathology Results  (Last 10 years)    None        POCT Glucose: No results for input(s): POCTGLUCOSE in the last 48 hours.  Prealbumin: No results for input(s): PREALBUMIN in the last 48 hours.  Respiratory Culture: No results for input(s): GSRESP, RESPIRATORYC in the last 48 hours.  Troponin: No results for input(s): TROPONINI in the last 48 hours.  TSH: No results for input(s): TSH in the last 4320 hours.  Urine Culture: No results for input(s): LABURIN in the last 48 hours.  Urine Studies: No results for input(s): COLORU, APPEARANCEUA, PHUR, SPECGRAV, PROTEINUA, GLUCUA, KETONESU, BILIRUBINUA, OCCULTUA, NITRITE, UROBILINOGEN, LEUKOCYTESUR, RBCUA, WBCUA, BACTERIA, SQUAMEPITHEL, HYALINECASTS in the last 48 hours.    Invalid input(s): BENJI  All pertinent labs within the past 24 hours have been reviewed.    Significant Imaging: I have reviewed all pertinent imaging results/findings within the past 24  "hours.    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  - Liver/pancreatic mass s/p biopsy 6/17 at [facility].  - CT abdomen 11/9: there are innumerable hypoenhancing lesion seen throughout the hepatic parenchyma and a pancreatic mass.Thrombus is seen within the inferior vena cava right at the junction of the inferior vena cava and right atrium.  - MRI brain shows no evidence of mets  - On Lovenox 70mg q24.  - Medical records from [facility] show liver biopsy consistent with carcinoma, GATA3+, but the morphology and immunohistochemical profile are not consistent with metastasis from her renal tumor.  - Hem/Onc consulted, appreciate recs.  - Recommended f/u withing [facility] system.  -  Urine cytology, FOBT pending.          Cancer with unknown primary site    -- by [facility] path   -- consulted hem /onc  -- no signs of obstruction.           Weakness    - most likely due to dehydration from poor oral intake, as her power on examination was normal but pt still feels weak and has not been ambulating.  - Pt/OT consulted, SNF recommended           Mild malnutrition    - Poor oral intake.  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    - S/p right nephrectomy in 2016          Mass of pancreas              Liver masses    - she had Carcinoma of unknown origin as per path reports. Likely histo resemblance as Gu/breast (based on GATA3+) or cholangiocarcinoma as primary.   -- Hem/Onc following.        Microcytic anemia    - Most likely iron deficiency anemia due to malnuration  - FOBT pending.            Leukocytosis    - SIRS 2/4 "tachycardia and leucocytosis", improving WBC trends.   - Pt has no obvious source of infection.  - Blood Cx NGTD, UA is negative  - keep off Abx          VTE Risk Mitigation         " Ordered     enoxaparin injection 70 mg  Every 24 hours (non-standard times)     Route:  Subcutaneous        11/09/17 1124     High Risk of VTE  Once      11/09/17 0739     Place sequential compression device  Until discontinued      11/09/17 0739     Place sequential compression device  Until discontinued      11/08/17 2200              Mamadou Cabral MD  Department of Hospital Medicine   [facility]

## 2017-11-13 LAB
BASOPHILS # BLD AUTO: 0.02 K/UL
BASOPHILS NFR BLD: 0.1 %
DIFFERENTIAL METHOD: ABNORMAL
EOSINOPHIL # BLD AUTO: 0.4 K/UL
EOSINOPHIL NFR BLD: 2 %
ERYTHROCYTE [DISTWIDTH] IN BLOOD BY AUTOMATED COUNT: 15.7 %
HCT VFR BLD AUTO: 26.9 %
HGB BLD-MCNC: 8.5 G/DL
IMM GRANULOCYTES # BLD AUTO: 0.23 K/UL
IMM GRANULOCYTES NFR BLD AUTO: 1.2 %
LYMPHOCYTES # BLD AUTO: 1.3 K/UL
LYMPHOCYTES NFR BLD: 7.1 %
MCH RBC QN AUTO: 23.2 PG
MCHC RBC AUTO-ENTMCNC: 31.6 G/DL
MCV RBC AUTO: 74 FL
MONOCYTES # BLD AUTO: 1.5 K/UL
MONOCYTES NFR BLD: 7.9 %
NEUTROPHILS # BLD AUTO: 15.4 K/UL
NEUTROPHILS NFR BLD: 81.7 %
NRBC BLD-RTO: 0 /100 WBC
PLATELET # BLD AUTO: 206 K/UL
PMV BLD AUTO: 11.7 FL
RBC # BLD AUTO: 3.66 M/UL
WBC # BLD AUTO: 18.79 K/UL

## 2017-11-13 PROCEDURE — 97535 SELF CARE MNGMENT TRAINING: CPT

## 2017-11-13 PROCEDURE — 99231 SBSQ HOSP IP/OBS SF/LOW 25: CPT | Mod: ,,, | Performed by: INTERNAL MEDICINE

## 2017-11-13 PROCEDURE — 11000001 HC ACUTE MED/SURG PRIVATE ROOM

## 2017-11-13 PROCEDURE — 97530 THERAPEUTIC ACTIVITIES: CPT

## 2017-11-13 PROCEDURE — 36415 COLL VENOUS BLD VENIPUNCTURE: CPT

## 2017-11-13 PROCEDURE — 85025 COMPLETE CBC W/AUTO DIFF WBC: CPT

## 2017-11-13 PROCEDURE — 63600175 PHARM REV CODE 636 W HCPCS: Performed by: INTERNAL MEDICINE

## 2017-11-13 RX ADMIN — ENOXAPARIN SODIUM 70 MG: 100 INJECTION SUBCUTANEOUS at 12:11

## 2017-11-13 NOTE — PLAN OF CARE
"Sw met with Pt in the room, encouraged Pt to provide choices for SNF/NH level of care as family is informing IM team that they are unable to care for Pt at home. Pt states "she lives with her niece," Sw informed Pt to work with therapy and talk with family about choices in the [place] area for therapy prior to d/c home. Sw reintroduced list to Pt again and informed Pt he will return in the pm for choices. CM and IM team aware. Sarah informed by resident that sister Lacie has some NH/SNF options for Sw to make for Pt. Sarah did place call to , Lacie informed Sw that "she will visit [facility] today and would like a referral sent to facility." Sw encouraged sister to help Pt provide one or two more choices for therapy in the [place] area and Sw to follow.   "

## 2017-11-13 NOTE — ASSESSMENT & PLAN NOTE
- S/p right nephrectomy 11/2016 in [facility].  - Liver/pancreatic mass s/p biopsy 6/17 at [facility].  - CT abdomen 11/9: there are innumerable hypoenhancing lesion seen throughout the hepatic parenchyma and a pancreatic mass.Thrombus is seen within the inferior vena cava right at the junction of the inferior vena cava and right atrium.  - MRI brain shows no evidence of mets  - On Lovenox 70mg q24.  - Medical records from [facility] show liver biopsy consistent with carcinoma, GATA3+, but the morphology and immunohistochemical profile are not consistent with metastasis from her renal tumor.  - Hem/Onc consulted, appreciate recs.  - Recommended f/u withing [facility] system.  -  Urine cytology, FOBT pending.      Spoke with the patient's sister. Explained that her outpatient f/u needs to be at [facility] due to insurance restrictions and also that she needs to be on daily Lovenox injections for IVC thrombus.  Sister affirmed that she understood and also stated that she would like SNF placement for the patient due to inability to care for her at home. Will work with our SW to coordinate placement.

## 2017-11-13 NOTE — CARE UPDATE
Spoke with the patient's sister. Explained that her outpatient f/u needs to be at [facility] due to insurance restrictions and also that she needs to be on daily Lovenox injections for the IVC thrombus.  Sister affirmed that she understood and also stated that she would like SNF placement for the patient due to inability to care for her at home. Will work with our SW to coordinate placement.

## 2017-11-13 NOTE — PROGRESS NOTES
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 Presented to ED with weakness, decreased appetite, nausea and abdominal discomfort.    She  Has been in her usual state of health until 4 days ago when she became more sleepy eating less and feels drained. She reports some nausea and RLQ pain that is more discomfort "gas", pain was improved by OTC medication, She state that she didn't have BM for 4 days"possiblly due to her not eating much" she had lost 10-15 lb in couple of months. Denies any vomiting any bleeding per rectum, any dysuria, fever, or chills, no hx of seizures. She and her sister jono pt ever been on chemotherapy or had radiation. She is not on any medication currently.  Pt is following at [facility] where she did the surgery, she has appointment on the 20th nov for follow up on her biopsy results but she prefers here.    Her memory problems started before the nephrectomy, where she forgets things, people but always were oriented.    Hospital Course:  In The ED given "ceftrixone, vancomycin and metronidazole", I L Nacl, CT abdomen, head and CXR done with labs.    11/10: still tired with mild memory impairments, tachycardic on exam. consulted hem onc. And pending MRI brain final reads. Working with PT/OT, need SNF.       11/11: Stable. Still disoriented to place and time. MRI brain shows no evidence of mets. Ammonia WNL.     11/12: Stable. Oriented " to person and place. Pending SNF placement. Hem/ Onc recommending f/u with [facility].    11/13: Stable. Oriented to person and place. Pending SNF placement. Hem/ Onc recommending f/u with [facility].    Interval History: NAEON. Vitals stable. Pending SNF placement.    Review of Systems   Unable to perform ROS: Other   Constitutional: Positive for activity change and appetite change. Negative for chills and fever.   Respiratory: Negative for shortness of breath.    Cardiovascular: Negative for chest pain.   Gastrointestinal: Negative for abdominal pain, diarrhea and vomiting.   Neurological: Negative for dizziness and light-headedness.   Psychiatric/Behavioral: Positive for confusion. Negative for agitation.     Objective:     Vital Signs (Most Recent):  Temp: 98.3 °F (36.8 °C) (11/13/17 0750)  Pulse: 104 (11/13/17 0750)  Resp: 18 (11/13/17 0750)  BP: (!) 140/66 (11/13/17 0750)  SpO2: 97 % (11/13/17 0750) Vital Signs (24h Range):  Temp:  [96.7 °F (35.9 °C)-98.3 °F (36.8 °C)] 98.3 °F (36.8 °C)  Pulse:  [] 104  Resp:  [16-18] 18  SpO2:  [93 %-98 %] 97 %  BP: (110-140)/(57-76) 140/66     Weight: 49.5 kg (109 lb 1.6 oz)  Body mass index is 18.73 kg/m².    Intake/Output Summary (Last 24 hours) at 11/13/17 1056  Last data filed at 11/12/17 1800   Gross per 24 hour   Intake              500 ml   Output                0 ml   Net              500 ml      Physical Exam   Constitutional: No distress.   Eyes: Pupils are equal, round, and reactive to light.   Cardiovascular: Normal rate and regular rhythm.    Pulmonary/Chest: Effort normal and breath sounds normal.   Abdominal: Soft. Bowel sounds are normal.   Musculoskeletal: She exhibits no edema.   Neurological: She is alert.   Oriented x2   Nursing note and vitals reviewed.      Significant Labs:   A1C:   Recent Labs  Lab 11/09/17  0015 11/09/17  0805   HGBA1C 5.3 5.3     ABGs: No results for input(s): PH, PCO2, HCO3, POCSATURATED, BE, TOTALHB, COHB, METHB, O2HB, POCFIO2 in the last  48 hours.  Bilirubin:   Recent Labs  Lab 11/08/17  1758 11/09/17  0437   BILITOT 0.8 0.6     Blood Culture: No results for input(s): LABBLOO in the last 48 hours.  BMP: No results for input(s): GLU, NA, K, CL, CO2, BUN, CREATININE, CALCIUM, MG in the last 48 hours.  CBC:   Recent Labs  Lab 11/12/17  0352 11/13/17  0449   WBC 17.73* 18.79*   HGB 7.8* 8.5*   HCT 25.0* 26.9*    206     CMP: No results for input(s): NA, K, CL, CO2, GLU, BUN, CREATININE, CALCIUM, PROT, ALBUMIN, BILITOT, ALKPHOS, AST, ALT, ANIONGAP, EGFRNONAA in the last 48 hours.    Invalid input(s): ESTGFAFRICA  Cardiac Markers: No results for input(s): CKMB, MYOGLOBIN, BNP, TROPISTAT in the last 48 hours.  Coagulation: No results for input(s): INR, APTT in the last 48 hours.    Invalid input(s): PT  Lactic Acid: No results for input(s): LACTATE in the last 48 hours.  Lipase: No results for input(s): LIPASE in the last 48 hours.  Lipid Panel: No results for input(s): CHOL, HDL, LDLCALC, TRIG, CHOLHDL in the last 48 hours.  Magnesium: No results for input(s): MG in the last 48 hours.  Pathology Results  (Last 10 years)    None        POCT Glucose: No results for input(s): POCTGLUCOSE in the last 48 hours.  Prealbumin: No results for input(s): PREALBUMIN in the last 48 hours.  Respiratory Culture: No results for input(s): GSRESP, RESPIRATORYC in the last 48 hours.  Troponin: No results for input(s): TROPONINI in the last 48 hours.  TSH: No results for input(s): TSH in the last 4320 hours.  Urine Culture: No results for input(s): LABURIN in the last 48 hours.  Urine Studies: No results for input(s): COLORU, APPEARANCEUA, PHUR, SPECGRAV, PROTEINUA, GLUCUA, KETONESU, BILIRUBINUA, OCCULTUA, NITRITE, UROBILINOGEN, LEUKOCYTESUR, RBCUA, WBCUA, BACTERIA, SQUAMEPITHEL, HYALINECASTS in the last 48 hours.    Invalid input(s): BENJI  All pertinent labs within the past 24 hours have been reviewed.    Significant Imaging: I have reviewed all pertinent  imaging results/findings within the past 24 hours.    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  - Liver/pancreatic mass s/p biopsy 6/17 at [facility].  - CT abdomen 11/9: there are innumerable hypoenhancing lesion seen throughout the hepatic parenchyma and a pancreatic mass.Thrombus is seen within the inferior vena cava right at the junction of the inferior vena cava and right atrium.  - MRI brain shows no evidence of mets  - On Lovenox 70mg q24.  - Medical records from [facility] show liver biopsy consistent with carcinoma, GATA3+, but the morphology and immunohistochemical profile are not consistent with metastasis from her renal tumor.  - Hem/Onc consulted, appreciate recs.  - Recommended f/u withing [facility] system.  -  Urine cytology, FOBT pending.      Spoke with the patient's sister. 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  Sister affirmed that she understood and also stated that she would like SNF placement for the patient due to inability to care for her at home. Will work with our SW to coordinate placement.          Cancer with unknown primary site    -- by [facility] path   -- consulted hem /onc  -- no signs of obstruction.           Weakness    - most likely due to dehydration from poor oral intake, as her power on examination was normal but pt still feels weak and has not been ambulating.  - Pt/OT consulted, SNF recommended           Mild malnutrition    - Poor oral intake.  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    - S/p right nephrectomy in 2016          Mass of pancreas              Liver masses    - she had Carcinoma of unknown origin as per path reports.  "Likely histo resemblance as Gu/breast (based on GATA3+) or cholangiocarcinoma as primary.   -- Hem/Onc following.        Microcytic anemia    - Most likely iron deficiency anemia due to malnuration  - FOBT pending.            Leukocytosis    - SIRS 2/4 "tachycardia and leucocytosis", improving WBC trends.   - Pt has no obvious source of infection.  - Blood Cx NGTD, UA is negative  - keep off Abx          VTE Risk Mitigation         Ordered     enoxaparin injection 70 mg  Every 24 hours (non-standard times)     Route:  Subcutaneous        11/09/17 1124     High Risk of VTE  Once      11/09/17 0739     Place sequential compression device  Until discontinued      11/09/17 0739     Place sequential compression device  Until discontinued      11/08/17 2200              Mamadou Cabral MD  Department of Hospital Medicine   [facility]  "

## 2017-11-13 NOTE — PLAN OF CARE
Problem: Fall Risk (Adult)  Intervention: Monitor/Assist with Self Care   17   Activity   Activity Assistance Provided assistance, 1 person   Functional Level Current   Ambulation 2 - assistive person   Transferring 2 - assistive person   Toileting 2 - assistive person   Bathing 2 - assistive person   Dressing 2 - assistive person   Eating 0 - independent   Communication 0 - understands/communicates without difficulty   Swallowing 0 - swallows foods/liquids without difficulty   Daily Care Interventions   Self-Care Promotion independence encouraged;BADL personal objects within reach;BADL personal routines maintained;meal setup provided     Intervention: Reduce Risk/Promote Restraint Free Environment   17   Safety Interventions   Environmental Safety Modification assistive device/personal items within reach;clutter free environment maintained;lighting adjusted;room near unit station;room organization consistent   Safety Interventions   Safety Precautions emergency equipment at bedside   Prevent  Drop/Fall   Safety/Security Measures bed alarm set     Intervention: Patient Rounds   17   Safety Interventions   Patient Rounds bed in low position;bed wheels locked;call light in reach;clutter free environment maintained;ID band on;placement of personal items at bedside;visualized patient     Intervention: Safety Promotion/Fall Prevention   17   Safety Interventions   Safety Promotion/Fall Prevention assistive device/personal item within reach;bed alarm set;Fall Risk reviewed with patient/family;lighting adjusted;medications reviewed;nonskid shoes/socks when out of bed;room near unit station;side rails raised x 2;supervised activity;instructed to call staff for mobility       Goal: Identify Related Risk Factors and Signs and Symptoms  Related risk factors and signs and symptoms are identified upon initiation of Human Response Clinical Practice Guideline (CPG)   Outcome:  Ongoing (interventions implemented as appropriate)   11/12/17 2225   Fall Risk   Related Risk Factors (Fall Risk) confusion/agitation;gait/mobility problems;inadequate lighting;environment unfamiliar   Signs and Symptoms (Fall Risk) presence of risk factors     Goal: Absence of Falls  Patient will demonstrate the desired outcomes by discharge/transition of care.   Outcome: Ongoing (interventions implemented as appropriate)   11/12/17 2225   Fall Risk (Adult)   Absence of Falls making progress toward outcome       Comments: Care plan discussed with pt. Understanding of plan acknowledged by pt. Fall precautions explained to pt. Pt verbalized  understanding of precautions and safety instructions. Bed in low, locked position, call light within reach, bed alarm active and audiable. Personal items within a safe distance for reach. Pt remains free from falls this shift. Plan for pain management discussed with pt, denies pain and discomfort at this time.

## 2017-11-13 NOTE — PLAN OF CARE
Problem: Occupational Therapy Goal  Goal: Occupational Therapy Goal  Goals to be met by: 12/2    Patient will increase functional independence with ADLs by performing:    UE Dressing with [place].  LE Dressing with [place].  Grooming while standing at sink with Stand-by Assistance.  Toileting from toilet with Stand-by Assistance for hygiene and clothing management.      Outcome: Ongoing (interventions implemented as appropriate)  Goals addressed and unmet.  Cont with POC  Lelia Little OT  11/13/2017

## 2017-11-13 NOTE — SUBJECTIVE & OBJECTIVE
Interval History: NAEON. Vitals stable. Pending SNF placement.    Review of Systems   Unable to perform ROS: Other   Constitutional: Positive for activity change and appetite change. Negative for chills and fever.   Respiratory: Negative for shortness of breath.    Cardiovascular: Negative for chest pain.   Gastrointestinal: Negative for abdominal pain, diarrhea and vomiting.   Neurological: Negative for dizziness and light-headedness.   Psychiatric/Behavioral: Positive for confusion. Negative for agitation.     Objective:     Vital Signs (Most Recent):  Temp: 98.3 °F (36.8 °C) (11/13/17 0750)  Pulse: 104 (11/13/17 0750)  Resp: 18 (11/13/17 0750)  BP: (!) 140/66 (11/13/17 0750)  SpO2: 97 % (11/13/17 0750) Vital Signs (24h Range):  Temp:  [96.7 °F (35.9 °C)-98.3 °F (36.8 °C)] 98.3 °F (36.8 °C)  Pulse:  [] 104  Resp:  [16-18] 18  SpO2:  [93 %-98 %] 97 %  BP: (110-140)/(57-76) 140/66     Weight: 49.5 kg (109 lb 1.6 oz)  Body mass index is 18.73 kg/m².    Intake/Output Summary (Last 24 hours) at 11/13/17 1056  Last data filed at 11/12/17 1800   Gross per 24 hour   Intake              500 ml   Output                0 ml   Net              500 ml      Physical Exam   Constitutional: No distress.   Eyes: Pupils are equal, round, and reactive to light.   Cardiovascular: Normal rate and regular rhythm.    Pulmonary/Chest: Effort normal and breath sounds normal.   Abdominal: Soft. Bowel sounds are normal.   Musculoskeletal: She exhibits no edema.   Neurological: She is alert.   Oriented x2   Nursing note and vitals reviewed.      Significant Labs:   A1C:   Recent Labs  Lab 11/09/17  0015 11/09/17  0805   HGBA1C 5.3 5.3     ABGs: No results for input(s): PH, PCO2, HCO3, POCSATURATED, BE, TOTALHB, COHB, METHB, O2HB, POCFIO2 in the last 48 hours.  Bilirubin:   Recent Labs  Lab 11/08/17  1758 11/09/17  0437   BILITOT 0.8 0.6     Blood Culture: No results for input(s): LABBLOO in the last 48 hours.  BMP: No results for  input(s): GLU, NA, K, CL, CO2, BUN, CREATININE, CALCIUM, MG in the last 48 hours.  CBC:   Recent Labs  Lab 11/12/17  0352 11/13/17  0449   WBC 17.73* 18.79*   HGB 7.8* 8.5*   HCT 25.0* 26.9*    206     CMP: No results for input(s): NA, K, CL, CO2, GLU, BUN, CREATININE, CALCIUM, PROT, ALBUMIN, BILITOT, ALKPHOS, AST, ALT, ANIONGAP, EGFRNONAA in the last 48 hours.    Invalid input(s): ESTGFAFRICA  Cardiac Markers: No results for input(s): CKMB, MYOGLOBIN, BNP, TROPISTAT in the last 48 hours.  Coagulation: No results for input(s): INR, APTT in the last 48 hours.    Invalid input(s): PT  Lactic Acid: No results for input(s): LACTATE in the last 48 hours.  Lipase: No results for input(s): LIPASE in the last 48 hours.  Lipid Panel: No results for input(s): CHOL, HDL, LDLCALC, TRIG, CHOLHDL in the last 48 hours.  Magnesium: No results for input(s): MG in the last 48 hours.  Pathology Results  (Last 10 years)    None        POCT Glucose: No results for input(s): POCTGLUCOSE in the last 48 hours.  Prealbumin: No results for input(s): PREALBUMIN in the last 48 hours.  Respiratory Culture: No results for input(s): GSRESP, RESPIRATORYC in the last 48 hours.  Troponin: No results for input(s): TROPONINI in the last 48 hours.  TSH: No results for input(s): TSH in the last 4320 hours.  Urine Culture: No results for input(s): LABURIN in the last 48 hours.  Urine Studies: No results for input(s): COLORU, APPEARANCEUA, PHUR, SPECGRAV, PROTEINUA, GLUCUA, KETONESU, BILIRUBINUA, OCCULTUA, NITRITE, UROBILINOGEN, LEUKOCYTESUR, RBCUA, WBCUA, BACTERIA, SQUAMEPITHEL, HYALINECASTS in the last 48 hours.    Invalid input(s): MARIELLASUR  All pertinent labs within the past 24 hours have been reviewed.    Significant Imaging: I have reviewed all pertinent imaging results/findings within the past 24 hours.

## 2017-11-14 LAB
BACTERIA BLD CULT: NORMAL
BACTERIA BLD CULT: NORMAL
BASOPHILS # BLD AUTO: 0.03 K/UL
BASOPHILS # BLD AUTO: 0.03 K/UL
BASOPHILS # BLD AUTO: 0.04 K/UL
BASOPHILS NFR BLD: 0.1 %
BASOPHILS NFR BLD: 0.2 %
BASOPHILS NFR BLD: 0.2 %
DIFFERENTIAL METHOD: ABNORMAL
EOSINOPHIL # BLD AUTO: 0.3 K/UL
EOSINOPHIL # BLD AUTO: 0.4 K/UL
EOSINOPHIL # BLD AUTO: 0.4 K/UL
EOSINOPHIL NFR BLD: 1.3 %
EOSINOPHIL NFR BLD: 1.8 %
EOSINOPHIL NFR BLD: 1.9 %
ERYTHROCYTE [DISTWIDTH] IN BLOOD BY AUTOMATED COUNT: 15.8 %
ERYTHROCYTE [DISTWIDTH] IN BLOOD BY AUTOMATED COUNT: 15.9 %
ERYTHROCYTE [DISTWIDTH] IN BLOOD BY AUTOMATED COUNT: 16 %
HCT VFR BLD AUTO: 24 %
HCT VFR BLD AUTO: 26.2 %
HCT VFR BLD AUTO: 26.4 %
HGB BLD-MCNC: 7.5 G/DL
HGB BLD-MCNC: 8.1 G/DL
HGB BLD-MCNC: 8.3 G/DL
IMM GRANULOCYTES # BLD AUTO: 0.19 K/UL
IMM GRANULOCYTES # BLD AUTO: 0.32 K/UL
IMM GRANULOCYTES # BLD AUTO: 0.45 K/UL
IMM GRANULOCYTES NFR BLD AUTO: 1 %
IMM GRANULOCYTES NFR BLD AUTO: 1.5 %
IMM GRANULOCYTES NFR BLD AUTO: 1.9 %
LYMPHOCYTES # BLD AUTO: 1.3 K/UL
LYMPHOCYTES # BLD AUTO: 1.4 K/UL
LYMPHOCYTES # BLD AUTO: 1.4 K/UL
LYMPHOCYTES NFR BLD: 6.1 %
LYMPHOCYTES NFR BLD: 6.5 %
LYMPHOCYTES NFR BLD: 6.9 %
MCH RBC QN AUTO: 22.4 PG
MCH RBC QN AUTO: 22.7 PG
MCH RBC QN AUTO: 23.2 PG
MCHC RBC AUTO-ENTMCNC: 30.9 G/DL
MCHC RBC AUTO-ENTMCNC: 31.3 G/DL
MCHC RBC AUTO-ENTMCNC: 31.4 G/DL
MCV RBC AUTO: 72 FL
MCV RBC AUTO: 73 FL
MCV RBC AUTO: 74 FL
MONOCYTES # BLD AUTO: 1.4 K/UL
MONOCYTES # BLD AUTO: 1.5 K/UL
MONOCYTES # BLD AUTO: 1.6 K/UL
MONOCYTES NFR BLD: 7 %
MONOCYTES NFR BLD: 7 %
MONOCYTES NFR BLD: 7.5 %
NEUTROPHILS # BLD AUTO: 15.7 K/UL
NEUTROPHILS # BLD AUTO: 17.9 K/UL
NEUTROPHILS # BLD AUTO: 19.3 K/UL
NEUTROPHILS NFR BLD: 82.5 %
NEUTROPHILS NFR BLD: 83.1 %
NEUTROPHILS NFR BLD: 83.5 %
NRBC BLD-RTO: 0 /100 WBC
PLATELET # BLD AUTO: 180 K/UL
PLATELET # BLD AUTO: 213 K/UL
PLATELET # BLD AUTO: 229 K/UL
PMV BLD AUTO: 11.8 FL
PMV BLD AUTO: 12.7 FL
PMV BLD AUTO: 13 FL
RBC # BLD AUTO: 3.3 M/UL
RBC # BLD AUTO: 3.58 M/UL
RBC # BLD AUTO: 3.62 M/UL
WBC # BLD AUTO: 18.97 K/UL
WBC # BLD AUTO: 21.57 K/UL
WBC # BLD AUTO: 23.17 K/UL

## 2017-11-14 PROCEDURE — 36415 COLL VENOUS BLD VENIPUNCTURE: CPT

## 2017-11-14 PROCEDURE — 11000001 HC ACUTE MED/SURG PRIVATE ROOM

## 2017-11-14 PROCEDURE — 99231 SBSQ HOSP IP/OBS SF/LOW 25: CPT | Mod: ,,, | Performed by: HOSPITALIST

## 2017-11-14 PROCEDURE — 63600175 PHARM REV CODE 636 W HCPCS: Performed by: INTERNAL MEDICINE

## 2017-11-14 PROCEDURE — 63600175 PHARM REV CODE 636 W HCPCS: Performed by: STUDENT IN AN ORGANIZED HEALTH CARE EDUCATION/TRAINING PROGRAM

## 2017-11-14 PROCEDURE — 85025 COMPLETE CBC W/AUTO DIFF WBC: CPT | Mod: 91

## 2017-11-14 PROCEDURE — 97116 GAIT TRAINING THERAPY: CPT

## 2017-11-14 RX ADMIN — ONDANSETRON 4 MG: 2 INJECTION INTRAMUSCULAR; INTRAVENOUS at 09:11

## 2017-11-14 RX ADMIN — ENOXAPARIN SODIUM 70 MG: 100 INJECTION SUBCUTANEOUS at 12:11

## 2017-11-14 NOTE — SUBJECTIVE & OBJECTIVE
Interval History: NAEON. Vitals stable.    Review of Systems   Constitutional: Positive for activity change, appetite change and unexpected weight change. Negative for chills and fever.   Respiratory: Negative for cough and shortness of breath.    Cardiovascular: Negative for chest pain, palpitations and leg swelling.   Gastrointestinal: Negative for abdominal pain, diarrhea and vomiting.   Genitourinary: Negative for difficulty urinating.   Skin: Negative for pallor and wound.   Neurological: Negative for dizziness and light-headedness.   Psychiatric/Behavioral: Negative for agitation and confusion.     Objective:     Vital Signs (Most Recent):  Temp: 98.5 °F (36.9 °C) (11/14/17 1132)  Pulse: 110 (11/14/17 1132)  Resp: 18 (11/14/17 1132)  BP: 127/68 (11/14/17 1132)  SpO2: 95 % (11/14/17 1132) Vital Signs (24h Range):  Temp:  [98 °F (36.7 °C)-99.8 °F (37.7 °C)] 98.5 °F (36.9 °C)  Pulse:  [100-116] 110  Resp:  [14-18] 18  SpO2:  [94 %-98 %] 95 %  BP: (105-127)/(57-74) 127/68     Weight: 49.5 kg (109 lb 1.6 oz)  Body mass index is 18.73 kg/m².    Intake/Output Summary (Last 24 hours) at 11/14/17 1339  Last data filed at 11/14/17 0700   Gross per 24 hour   Intake                0 ml   Output                0 ml   Net                0 ml      Physical Exam   Constitutional: No distress.   Eyes: Pupils are equal, round, and reactive to light.   Cardiovascular: Normal rate, regular rhythm, normal heart sounds and intact distal pulses.    Pulmonary/Chest: Effort normal and breath sounds normal.   Abdominal: Soft. Bowel sounds are normal.   Neurological: She is alert.   A&O x2   Nursing note and vitals reviewed.      Significant Labs:   A1C:   Recent Labs  Lab 11/09/17  0015 11/09/17  0805   HGBA1C 5.3 5.3     ABGs: No results for input(s): PH, PCO2, HCO3, POCSATURATED, BE, TOTALHB, COHB, METHB, O2HB, POCFIO2 in the last 48 hours.  Bilirubin:   Recent Labs  Lab 11/08/17  1758 11/09/17  0437   BILITOT 0.8 0.6     Blood  Culture: No results for input(s): LABBLOO in the last 48 hours.  BMP: No results for input(s): GLU, NA, K, CL, CO2, BUN, CREATININE, CALCIUM, MG in the last 48 hours.  CBC:   Recent Labs  Lab 11/13/17  0449 11/14/17  0532   WBC 18.79* 18.97*   HGB 8.5* 7.5*   HCT 26.9* 24.0*    213     CMP: No results for input(s): NA, K, CL, CO2, GLU, BUN, CREATININE, CALCIUM, PROT, ALBUMIN, BILITOT, ALKPHOS, AST, ALT, ANIONGAP, EGFRNONAA in the last 48 hours.    Invalid input(s): ESTGFAFRICA  Cardiac Markers: No results for input(s): CKMB, MYOGLOBIN, BNP, TROPISTAT in the last 48 hours.  Coagulation: No results for input(s): INR, APTT in the last 48 hours.    Invalid input(s): PT  Lactic Acid: No results for input(s): LACTATE in the last 48 hours.  Lipase: No results for input(s): LIPASE in the last 48 hours.  Lipid Panel: No results for input(s): CHOL, HDL, LDLCALC, TRIG, CHOLHDL in the last 48 hours.  Magnesium: No results for input(s): MG in the last 48 hours.  Pathology Results  (Last 10 years)    None        POCT Glucose: No results for input(s): POCTGLUCOSE in the last 48 hours.  Prealbumin: No results for input(s): PREALBUMIN in the last 48 hours.  Respiratory Culture: No results for input(s): GSRESP, RESPIRATORYC in the last 48 hours.  Troponin: No results for input(s): TROPONINI in the last 48 hours.  TSH: No results for input(s): TSH in the last 4320 hours.  Urine Culture: No results for input(s): LABURIN in the last 48 hours.  Urine Studies: No results for input(s): COLORU, APPEARANCEUA, PHUR, SPECGRAV, PROTEINUA, GLUCUA, KETONESU, BILIRUBINUA, OCCULTUA, NITRITE, UROBILINOGEN, LEUKOCYTESUR, RBCUA, WBCUA, BACTERIA, SQUAMEPITHEL, HYALINECASTS in the last 48 hours.    Invalid input(s): WRIGHTSUR  All pertinent labs within the past 24 hours have been reviewed.    Significant Imaging: I have reviewed all pertinent imaging results/findings within the past 24 hours.

## 2017-11-14 NOTE — ASSESSMENT & PLAN NOTE
- Most likely iron deficiency anemia due to malnuration  - FOBT pending.  - Hg dropped to 7.5 today from 8.5  - No signs of any active bleeding.  - Will trend CBC q12 for today.

## 2017-11-14 NOTE — PLAN OF CARE
Problem: Fall Risk (Adult)  Goal: Identify Related Risk Factors and Signs and Symptoms  Related risk factors and signs and symptoms are identified upon initiation of Human Response Clinical Practice Guideline (CPG)   Outcome: Ongoing (interventions implemented as appropriate)  Reviewed POC. Instructed pt to continue to wear anti slip socks. Pt verbalized understanding of POC.

## 2017-11-14 NOTE — PLAN OF CARE
Problem: Patient Care Overview  Goal: Plan of Care Review  Slept most of this shift. [place] to person  And place only.

## 2017-11-14 NOTE — PROGRESS NOTES
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 Presented to ED with weakness, decreased appetite, nausea and abdominal discomfort.    She  Has been in her usual state of health until 4 days ago when she became more sleepy eating less and feels drained. She reports some nausea and RLQ pain that is more discomfort "gas", pain was improved by OTC medication, She state that she didn't have BM for 4 days"possiblly due to her not eating much" she had lost 10-15 lb in couple of months. Denies any vomiting any bleeding per rectum, any dysuria, fever, or chills, no hx of seizures. She and her sister jono pt ever been on chemotherapy or had radiation. She is not on any medication currently.  Pt is following at [facility] where she did the surgery, she has appointment on the 20th nov for follow up on her biopsy results but she prefers here.    Her memory problems started before the nephrectomy, where she forgets things, people but always were oriented.    Hospital Course:  In The ED given "ceftrixone, vancomycin and metronidazole", I L Nacl, CT abdomen, head and CXR done with labs.    11/10: still tired with mild memory impairments, tachycardic on exam. consulted hem onc. And pending MRI brain final reads. Working with PT/OT, need SNF.       11/11: Stable. Still disoriented to place and time. MRI brain shows no evidence of mets. Ammonia WNL.     11/12: Stable. Oriented " to person and place. Pending SNF placement. Hem/ Onc recommending f/u with [facility].    11/13: Stable. Oriented to person and place. Pending SNF placement. Hem/ Onc recommending f/u with [facility].    11/14: Stable. Hg dropped to 7.5, but no signs of bleeding. Will trend CBC q12 for today.             Awaiting placement. Hem/ Onc recommending f/u with [facility].    Interval History: NAEON. Vitals stable.    Review of Systems   Constitutional: Positive for activity change, appetite change and unexpected weight change. Negative for chills and fever.   Respiratory: Negative for cough and shortness of breath.    Cardiovascular: Negative for chest pain, palpitations and leg swelling.   Gastrointestinal: Negative for abdominal pain, diarrhea and vomiting.   Genitourinary: Negative for difficulty urinating.   Skin: Negative for pallor and wound.   Neurological: Negative for dizziness and light-headedness.   Psychiatric/Behavioral: Negative for agitation and confusion.     Objective:     Vital Signs (Most Recent):  Temp: 98.5 °F (36.9 °C) (11/14/17 1132)  Pulse: 110 (11/14/17 1132)  Resp: 18 (11/14/17 1132)  BP: 127/68 (11/14/17 1132)  SpO2: 95 % (11/14/17 1132) Vital Signs (24h Range):  Temp:  [98 °F (36.7 °C)-99.8 °F (37.7 °C)] 98.5 °F (36.9 °C)  Pulse:  [100-116] 110  Resp:  [14-18] 18  SpO2:  [94 %-98 %] 95 %  BP: (105-127)/(57-74) 127/68     Weight: 49.5 kg (109 lb 1.6 oz)  Body mass index is 18.73 kg/m².    Intake/Output Summary (Last 24 hours) at 11/14/17 1339  Last data filed at 11/14/17 0700   Gross per 24 hour   Intake                0 ml   Output                0 ml   Net                0 ml      Physical Exam   Constitutional: No distress.   Eyes: Pupils are equal, round, and reactive to light.   Cardiovascular: Normal rate, regular rhythm, normal heart sounds and intact distal pulses.    Pulmonary/Chest: Effort normal and breath sounds normal.   Abdominal: Soft. Bowel sounds are normal.   Neurological: She is alert.   A&O x2    Nursing note and vitals reviewed.      Significant Labs:   A1C:   Recent Labs  Lab 11/09/17  0015 11/09/17  0805   HGBA1C 5.3 5.3     ABGs: No results for input(s): PH, PCO2, HCO3, POCSATURATED, BE, TOTALHB, COHB, METHB, O2HB, POCFIO2 in the last 48 hours.  Bilirubin:   Recent Labs  Lab 11/08/17  1758 11/09/17  0437   BILITOT 0.8 0.6     Blood Culture: No results for input(s): LABBLOO in the last 48 hours.  BMP: No results for input(s): GLU, NA, K, CL, CO2, BUN, CREATININE, CALCIUM, MG in the last 48 hours.  CBC:   Recent Labs  Lab 11/13/17  0449 11/14/17  0532   WBC 18.79* 18.97*   HGB 8.5* 7.5*   HCT 26.9* 24.0*    213     CMP: No results for input(s): NA, K, CL, CO2, GLU, BUN, CREATININE, CALCIUM, PROT, ALBUMIN, BILITOT, ALKPHOS, AST, ALT, ANIONGAP, EGFRNONAA in the last 48 hours.    Invalid input(s): ESTGFAFRICA  Cardiac Markers: No results for input(s): CKMB, MYOGLOBIN, BNP, TROPISTAT in the last 48 hours.  Coagulation: No results for input(s): INR, APTT in the last 48 hours.    Invalid input(s): PT  Lactic Acid: No results for input(s): LACTATE in the last 48 hours.  Lipase: No results for input(s): LIPASE in the last 48 hours.  Lipid Panel: No results for input(s): CHOL, HDL, LDLCALC, TRIG, CHOLHDL in the last 48 hours.  Magnesium: No results for input(s): MG in the last 48 hours.  Pathology Results  (Last 10 years)    None        POCT Glucose: No results for input(s): POCTGLUCOSE in the last 48 hours.  Prealbumin: No results for input(s): PREALBUMIN in the last 48 hours.  Respiratory Culture: No results for input(s): GSRESP, RESPIRATORYC in the last 48 hours.  Troponin: No results for input(s): TROPONINI in the last 48 hours.  TSH: No results for input(s): TSH in the last 4320 hours.  Urine Culture: No results for input(s): LABURIN in the last 48 hours.  Urine Studies: No results for input(s): COLORU, APPEARANCEUA, PHUR, SPECGRAV, PROTEINUA, GLUCUA, KETONESU, BILIRUBINUA, OCCULTUA, NITRITE,  UROBILINOGEN, LEUKOCYTESUR, RBCUA, WBCUA, BACTERIA, SQUAMEPITHEL, HYALINECASTS in the last 48 hours.    Invalid input(s): BENJI  All pertinent labs within the past 24 hours have been reviewed.    Significant Imaging: I have reviewed all pertinent imaging results/findings within the past 24 hours.    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  - Liver/pancreatic mass s/p biopsy 6/17 at [facility].  - CT abdomen 11/9: there are innumerable hypoenhancing lesion seen throughout the hepatic parenchyma and a pancreatic mass.Thrombus is seen within the inferior vena cava right at the junction of the inferior vena cava and right atrium.  - MRI brain shows no evidence of mets  - On Lovenox 70mg q24.  - Medical records from [facility] show liver biopsy consistent with carcinoma, GATA3+, but the morphology and immunohistochemical profile are not consistent with metastasis from her renal tumor.  - Hem/Onc consulted, appreciate recs.  - Recommended f/u withing [facility] system.  -  Urine cytology, FOBT pending.              Cancer with unknown primary site    -- by [facility] path   -- consulted hem /onc  -- no signs of obstruction.           Weakness    - most likely due to dehydration from poor oral intake, as her power on examination was normal but pt still feels weak and has not been ambulating.  - Pt/OT consulted, SNF recommended           Mild malnutrition    - Poor oral intake.  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    - S/p right nephrectomy in 2016          Mass of pancreas              Liver masses    - she had Carcinoma of unknown origin as per path reports. Likely histo resemblance as Gu/breast (based on GATA3+) or cholangiocarcinoma as primary.   -- Hem/Onc following.        Microcytic anemia  "   - Most likely iron deficiency anemia due to malnuration  - FOBT pending.  - Hg dropped to 7.5 today from 8.5  - No signs of any active bleeding.  - Will trend CBC q12 for today.            Leukocytosis    - SIRS 2/4 "tachycardia and leucocytosis", improving WBC trends.   - Pt has no obvious source of infection.  - Blood Cx NGTD, UA is negative  - keep off Abx          VTE Risk Mitigation         Ordered     enoxaparin injection 70 mg  Every 24 hours (non-standard times)     Route:  Subcutaneous        11/09/17 1124     High Risk of VTE  Once      11/09/17 0739     Place sequential compression device  Until discontinued      11/09/17 0739     Place sequential compression device  Until discontinued      11/08/17 2200              Mamadou Cabral MD  Department of Hospital Medicine   [facility]  "

## 2017-11-14 NOTE — PLAN OF CARE
"Sarah met with sister Lacie outside room, informed of denial and told that [facility] would not accept Medicaid for placement. Sarah offered options such as [facility], [facility], [facility] but sister refused informed aSrah "she wants [facility] and if denied, sister will take Pt home." Sarah uploaded referral, Sarah to follow.   "

## 2017-11-14 NOTE — PT/OT/SLP PROGRESS
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  Pt sister desires pt to go to SNF although she had stated she wanted her sister home.  Called CM to inform.         Objective:   Pt CGA for bed mobility     Functional Mobility:  Bed Mobility:SBA for bed mobility but with poor endurance       Transfers:    Sit to stand with SBA    Functional Ambulation: Pt able to demonstrate safe hallway mobility with rolling walker with encouragement for increased participation for gains.     Activities of Daily Living:    Pt improved performance with basic self care and increased volitional participation.  Has potential for I basic self care    Balance:   Static Sit: NORMAL: No deviations seen in posture held statically  Dynamic Sit: NORMAL: No deviations seen in posture held dynamically  Static Stand: NORMAL: No deviations seen in posture held statically  Dynamic stand: NORMAL: No deviations seen in posture held dynamically    Therapeutic Activities and Exercises:  Pt and sister educated on benefits of SNF over home, caregiver strain, importance of increased participation in self care for gains. Bed mobility overall stand by assist.      AM-PAC 6 CLICK ADL   How much help from another person does this patient currently need?   1 = Unable, Total/Dependent Assistance  2 = A lot, Maximum/Moderate Assistance  3 = A little, Minimum/Contact Guard/Supervision  4 = None, Modified [place]/Independent          AM-PAC  "Raw Score CMS "G-Code Modifier Level of Impairment Assistance   6 % Total / Unable   7 - 8 CM 80 - 100% Maximal Assist   9-13 CL 60 - 80% Moderate Assist   14 - 19 CK 40 - 60% Moderate Assist   20 - 22 CJ 20 - 40% Minimal Assist   23 CI 1-20% SBA / CGA   24 CH 0% Independent/ Mod I       Patient left supine with all lines intact    ASSESSMENT:  Yao Chapa is a 62 y.o. 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  Sister had stated she wanted pt to go home but is realizing she is not as available as she thought and pt would be home alone more than is safe.  Sister agrees to SNF stay for max gains.  Called CM to inform.  Pt with potential for return to PLOF.  .    Rehab identified problem list/impairments: Rehab identified problem list/impairments: weakness, impaired endurance, gait instability, decreased safety awareness    Rehab potential is good.    Activity tolerance: Good    Discharge recommendations: Discharge Facility/Level Of Care Needs: nursing facility, skilled     Barriers to discharge: Barriers to Discharge: Inaccessible home environment, Decreased caregiver support    Equipment recommendations: bath bench, walker, rolling     GOALS:    Occupational Therapy Goals        Problem: Occupational Therapy Goal    Goal Priority Disciplines Outcome Interventions   Occupational Therapy Goal     OT, PT/OT Ongoing (interventions implemented as appropriate)    Description:  Goals to be met by: 12/2    Patient will increase functional independence with ADLs by performing:    UE Dressing with [place].  LE Dressing with [place].  Grooming while standing at sink with Stand-by Assistance.  Toileting from toilet with Stand-by Assistance for hygiene and clothing management.                       Plan:  Patient to be seen 3 x/week to address the above listed problems via self-care/home management, " therapeutic activities, therapeutic exercises  Plan of Care expires: 12/09/17  Plan of Care reviewed with: patient, sibling         Lelia MELODY Little, OT  11/14/2017

## 2017-11-14 NOTE — PLAN OF CARE
11/14/17 1523   Discharge Reassessment   Assessment Type Discharge Planning Reassessment   Provided patient/caregiver education on the expected discharge date and the discharge plan Yes   Do you have any problems affording any of your prescribed medications? No   Discharge Plan A Skilled Nursing Facility   Discharge Plan B Home with family   Can the patient answer the patient profile reliably? No, cognitively impaired   How does the patient rate their overall health at the present time? Poor   Describe the patient's ability to walk at the present time. Minor restrictions or changes   How often would a person be available to care for the patient? Often

## 2017-11-14 NOTE — PLAN OF CARE
"[facility] referral has denied Pt due to "payor not being accepted." Sw to inform sister and follow with additional choices. Informed sister Lacie of denial due to Pt's insurance, Lacie informed Sw "they told me." Sw asked about "Pt's red, white and blue care per Lacie." Sw informed white card of Pt's is medicaid and medicare is red white and blue but Sw has no record of card. Lacie to arrive in room later and Sw and her to discuss other options as sister did not want to give me other choices until meeting with Sw later today. Cm updated to be sure no medicare card is available for Pt and Sw to follow.   "

## 2017-11-14 NOTE — ASSESSMENT & PLAN NOTE
- S/p right nephrectomy 11/2016 in [facility].  - Liver/pancreatic mass s/p biopsy 6/17 at [facility].  - CT abdomen 11/9: there are innumerable hypoenhancing lesion seen throughout the hepatic parenchyma and a pancreatic mass.Thrombus is seen within the inferior vena cava right at the junction of the inferior vena cava and right atrium.  - MRI brain shows no evidence of mets  - On Lovenox 70mg q24.  - Medical records from [facility] show liver biopsy consistent with carcinoma, GATA3+, but the morphology and immunohistochemical profile are not consistent with metastasis from her renal tumor.  - Hem/Onc consulted, appreciate recs.  - Recommended f/u withing [facility].  -  Urine cytology, FOBT pending.

## 2017-11-14 NOTE — PT/OT/SLP PROGRESS
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  Patient communicates feeling very tired and weak.    Pain/Comfort  Pain Rating 1: 7/10  Location - Side 1: Right  Location 1: abdomen    Objective:   Patient found with: telemetry    Functional Mobility:  Bed Mobility:   Rolling/Turning to Left: Supervision, With side rail  Rolling/Turning Right: Supervision, With side rail  Scooting/Bridging: Supervision, With side rail  Supine to Sit: Supervision, With side rail  Sit to Supine: Supervision, With siderail    Transfers:  Sit <> Stand Assistance: Contact Guard Assistance  Sit <> Stand Assistive Device: No Assistive Device    Gait:   Gait Distance: 82'   Assistance 1: Stand by Assistance  Gait Assistive Device: Rolling walker  Gait Pattern: reciprocal  Gait Deviation(s): decreased jayden, increased time in double stance, decreased velocity of limb motion, decreased step length, decreased stride length    Stairs:  Pt ascended/descend 4 stair(s) with No Assistive Device with bilateral and handrails with Minimal Assistance.     Balance:   Static Sit: GOOD: Takes MODERATE challenges from all directions  Dynamic Sit: GOOD: Maintains balance through MODERATE excursions of active trunk movement  Static Stand: FAIR+: Takes MINIMAL challenges from all directions  Dynamic stand: FAIR+: Needs CLOSE SUPERVISION during gait and is able to right self with minor LOB     Therapeutic Activities and Exercises:  Patient assisted w/ hallway ambulation and stair navigation   Patient  educated on therex to perform in room    AM-PAC 6 CLICK MOBILITY  How much help from another person does this patient currently need?   1 = Unable, Total/Dependent Assistance  2 = A lot, Maximum/Moderate Assistance  3 = A little, Minimum/Contact Guard/Supervision  4 = None, Modified [place]/Independent    Turning over in bed (including adjusting bedclothes, sheets and blankets)?: 4  Sitting down on and standing up from a chair with arms (e.g., wheelchair, bedside commode, etc.): 3  Moving from lying on back to sitting on the side of the bed?: 4  Moving to and from a bed to a chair (including a wheelchair)?: 3  Need to walk in hospital room?: 3  Climbing 3-5 steps with a railing?: 2  Total Score: 19    AM-PAC Raw Score CMS G-Code Modifier Level of Impairment Assistance   6 % Total / Unable   7 - 9 CM 80 - 100% Maximal Assist   10 - 14 CL 60 - 80% Moderate Assist   15 - 19 CK 40 - 60% Moderate Assist   20 - 22 CJ 20 - 40% Minimal Assist   23 CI 1-20% SBA / CGA   24 CH 0% Independent/ Mod I     Patient left supine with all lines intact, call button in reach, nurse notified and family present.    Assessment:  Yao Chapa is a 62 y.o. 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 Patient functional mobility and ambulation is limited by patient's decreased endurance. Patient unable to navigate stairs w/o increased assistance and rest breaks. Patient encouraged to perform therex following gait training, but requested that she rests first. Patient confirms that she will perform said therex at a later time today. Patient will continue to benefit from skilled PT services to address her listed impairments.     Rehab identified problem list/impairments: Rehab identified problem list/impairments: weakness, impaired endurance, gait instability, pain    Rehab potential is good.    Activity tolerance:  Fair    Discharge recommendations: Discharge Facility/Level Of Care Needs: nursing facility, skilled     Barriers to discharge: Barriers to Discharge: Inaccessible home environment, Decreased caregiver support    Equipment recommendations: Equipment Needed After Discharge: bath bench     GOALS:    Physical Therapy Goals        Problem: Physical Therapy Goal    Goal Priority Disciplines Outcome Goal Variances Interventions   Physical Therapy Goal     PT/OT, PT Ongoing (interventions implemented as appropriate)     Description:  Goals to be met by: 17     Patient will increase functional independence with mobility by performin. Supine to sit with Stand-by Assistance- MET  2. Sit to supine with Stand-by Assistance- MET  3. Sit to stand transfer with Stand-by Assistance   4. Bed to chair transfer with Stand-by Assistance using Rolling Walker   5. Gait  x 100 feet with Stand-by Assistance using Rolling Walker.   6. Ascend/descend 4 stair with no Handrails Contact Guard Assistance  7. Lower extremity exercise program x15 reps per handout, with independence                       PLAN:    Patient to be seen 4 x/week  to address the above listed problems via gait training, therapeutic activities, therapeutic exercises, neuromuscular re-education  Plan of Care expires: 17  Plan of Care reviewed with: patient         Filiberto Youngblood, PT  2017

## 2017-11-14 NOTE — PLAN OF CARE
Problem: Physical Therapy Goal  Goal: Physical Therapy Goal  Goals to be met by: 17     Patient will increase functional independence with mobility by performin. Supine to sit with Stand-by Assistance- MET  2. Sit to supine with Stand-by Assistance- MET  3. Sit to stand transfer with Stand-by Assistance   4. Bed to chair transfer with Stand-by Assistance using Rolling Walker   5. Gait  x 100 feet with Stand-by Assistance using Rolling Walker.   6. Ascend/descend 4 stair with no Handrails Contact Guard Assistance  7. Lower extremity exercise program x15 reps per handout, with independence      Outcome: Ongoing (interventions implemented as appropriate)  PT Goals remain appropriate, continue POC    Filiberto Youngblood, PT  2017

## 2017-11-14 NOTE — PLAN OF CARE
Problem: Patient Care Overview  Goal: Plan of Care Review  Outcome: Ongoing (interventions implemented as appropriate)  A/o slow to respond vs see flow sheet , no injury free of falls denies any discomfort  poor appetite plan of care reviewed with sister this  am

## 2017-11-15 PROBLEM — I82.220 IVC THROMBOSIS: Status: ACTIVE | Noted: 2017-11-15

## 2017-11-15 LAB
FACT V ACT/NOR PPP: 93 %
FACT VIII ACT/NOR PPP: 297 %
INR PPP: 1.9
PROTHROMBIN TIME: 19 SEC

## 2017-11-15 PROCEDURE — 85240 CLOT FACTOR VIII AHG 1 STAGE: CPT

## 2017-11-15 PROCEDURE — 97535 SELF CARE MNGMENT TRAINING: CPT

## 2017-11-15 PROCEDURE — 63600175 PHARM REV CODE 636 W HCPCS: Performed by: STUDENT IN AN ORGANIZED HEALTH CARE EDUCATION/TRAINING PROGRAM

## 2017-11-15 PROCEDURE — 63600175 PHARM REV CODE 636 W HCPCS: Performed by: INTERNAL MEDICINE

## 2017-11-15 PROCEDURE — 84590 ASSAY OF VITAMIN A: CPT

## 2017-11-15 PROCEDURE — 11000001 HC ACUTE MED/SURG PRIVATE ROOM

## 2017-11-15 PROCEDURE — 85610 PROTHROMBIN TIME: CPT

## 2017-11-15 PROCEDURE — 84597 ASSAY OF VITAMIN K: CPT

## 2017-11-15 PROCEDURE — 97530 THERAPEUTIC ACTIVITIES: CPT

## 2017-11-15 PROCEDURE — 25000003 PHARM REV CODE 250: Performed by: STUDENT IN AN ORGANIZED HEALTH CARE EDUCATION/TRAINING PROGRAM

## 2017-11-15 PROCEDURE — 99231 SBSQ HOSP IP/OBS SF/LOW 25: CPT | Mod: ,,, | Performed by: HOSPITALIST

## 2017-11-15 PROCEDURE — 97110 THERAPEUTIC EXERCISES: CPT

## 2017-11-15 PROCEDURE — 85220 BLOOC CLOT FACTOR V TEST: CPT

## 2017-11-15 RX ADMIN — METOCLOPRAMIDE 5 MG: 5 INJECTION, SOLUTION INTRAMUSCULAR; INTRAVENOUS at 08:11

## 2017-11-15 RX ADMIN — ALUMINUM HYDROXIDE, MAGNESIUM HYDROXIDE, AND SIMETHICONE 50 ML: 200; 200; 20 SUSPENSION ORAL at 08:11

## 2017-11-15 RX ADMIN — ENOXAPARIN SODIUM 70 MG: 100 INJECTION SUBCUTANEOUS at 01:11

## 2017-11-15 RX ADMIN — ACETAMINOPHEN 650 MG: 325 TABLET ORAL at 01:11

## 2017-11-15 NOTE — PLAN OF CARE
Problem: Physical Therapy Goal  Goal: Physical Therapy Goal  Goals to be met by: 17     Patient will increase functional independence with mobility by performin. Supine to sit with Stand-by Assistance- MET  2. Sit to supine with Stand-by Assistance- MET  3. Sit to stand transfer with Stand-by Assistance   4. Bed to chair transfer with Stand-by Assistance using Rolling Walker   5. Gait  x 100 feet with Stand-by Assistance using Rolling Walker.   6. Ascend/descend 4 stair with no Handrails Contact Guard Assistance  7. Lower extremity exercise program x15 reps per handout, with independence      Outcome: Ongoing (interventions implemented as appropriate)  PT Goals remain appropriate  Continue POC as appropriate    Filiberto Youngblood, PT  11/15/2017

## 2017-11-15 NOTE — PROGRESS NOTES
"[facility] Hospital Medicine  Progress Note    Patient Name: Yao Chapa  MRN: [id number]  Patient Class: IP- Inpatient   Admission Date: 11/8/2017  Length of Stay: 7 days  Attending Physician: Vikram Epstein, *  Primary Care Provider: [facility] Medicine Team: [facility] 2 GRABIEL Resendez    Subjective:     Principal Problem:IVC thrombosis    HPI:  This is a 61 y/o female with history of Renal mass s/p right nephrectomy 2016, liver and pancreatic mass s/p biopsy 6/2017 and memory problems for over 1 year. Presented to ED with weakness, decreased appetite, nausea and abdominal discomfort.    She  Has been in her usual state of health until 4 days ago when she became more sleepy eating less and feels drained. She reports some nausea and RLQ pain that is more discomfort "gas", pain was improved by OTC medication, She state that she didn't have BM for 4 days"possiblly due to her not eating much" she had lost 10-15 lb in couple of months. Denies any vomiting any bleeding per rectum, any dysuria, fever, or chills, no hx of seizures. She and her sister jono pt ever been on chemotherapy or had radiation. She is not on any medication currently.  Pt is following at [facility] where she did the surgery, she has appointment on the 20th nov for follow up on her biopsy results but she prefers here.    Her memory problems started before the nephrectomy, where she forgets things, people but always were oriented.    Hospital Course:  In The ED given "ceftrixone, vancomycin and metronidazole", I L Nacl, CT abdomen, head and CXR done with labs.    11/10: still tired with mild memory impairments, tachycardic on exam. consulted hem onc. And pending MRI brain final reads. Working with PT/OT, need SNF.       11/11: Stable. Still disoriented to place and time. MRI brain shows no evidence of mets. Ammonia WNL.     11/12: Stable. Oriented to person and place. Pending SNF placement. " Hem/ Onc recommending f/u with [facility].    11/13: Stable. Oriented to person and place. Pending SNF placement. Hem/ Onc recommending f/u with [facility].    11/14: Stable. Hg dropped to 7.5, but no signs of bleeding. Will trend CBC q12 for today.             Awaiting placement. Hem/ Onc recommending f/u with [facility].  11/15: working with PT/OT. Stable VS and no active complaints. Pending SNF vs home.         Review of Systems   Constitutional: Positive for activity change, appetite change and unexpected weight change. Negative for chills and fever.   Respiratory: Negative for cough and shortness of breath.    Cardiovascular: Negative for chest pain, palpitations and leg swelling.   Gastrointestinal: Negative for abdominal pain, diarrhea and vomiting.   Genitourinary: Negative for difficulty urinating.   Skin: Negative for pallor and wound.   Neurological: Negative for dizziness and light-headedness.   Psychiatric/Behavioral: Negative for agitation and confusion.     Objective:     Vital Signs (Most Recent):  Temp: 98.4 °F (36.9 °C) (11/15/17 1131)  Pulse: 110 (11/15/17 1131)  Resp: 20 (11/15/17 1131)  BP: 119/67 (11/15/17 1131)  SpO2: 98 % (11/15/17 1131) Vital Signs (24h Range):  Temp:  [98 °F (36.7 °C)-99.8 °F (37.7 °C)] 98.4 °F (36.9 °C)  Pulse:  [104-116] 110  Resp:  [16-20] 20  SpO2:  [94 %-98 %] 98 %  BP: (107-120)/(65-71) 119/67     Weight: 49.5 kg (109 lb 1.6 oz)  Body mass index is 18.73 kg/m².    Intake/Output Summary (Last 24 hours) at 11/15/17 1356  Last data filed at 11/15/17 0446   Gross per 24 hour   Intake                0 ml   Output                0 ml   Net                0 ml      Physical Exam   Constitutional: No distress.   Eyes: Pupils are equal, round, and reactive to light.   Cardiovascular: Normal rate, regular rhythm, normal heart sounds and intact distal pulses.    Pulmonary/Chest: Effort normal and breath sounds normal.   Abdominal: Soft. Bowel sounds are normal.   Neurological: She is alert.   A&O x2  "  Nursing note and vitals reviewed.      Significant Labs: All pertinent labs within the past 24 hours have been reviewed.        Assessment/Plan:      * IVC thrombosis      -- started on Lovenox daily dosing.   -- patient taught how to inject herself.         Cancer with unknown primary site    -- by [facility] path   -- consulted hem /onc  -- no signs of obstruction.           Weakness    - most likely due to dehydration from poor oral intake, as her power on examination was normal but pt still feels weak and has not been ambulating.  - Pt/OT consulted, SNF recommended           Mild malnutrition    - Poor oral intake.  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    - S/p right nephrectomy in 2016          Mass of pancreas              Liver masses    - she had Carcinoma of unknown origin as per path reports. Likely histo resemblance as Gu/breast (based on GATA3+) or cholangiocarcinoma as primary.   -- Hem/Onc consulted. To follow up with [facility] onc.         Microcytic anemia    - Most likely iron deficiency anemia due to malnuration  - FOBT pending.  - Hg dropped to 7.5 today from 8.5  - No signs of any active bleeding.  - Will trend CBC q12 for today.            Leukocytosis    - SIRS 2/4 "tachycardia and leucocytosis", improving WBC trends.   - Pt has no obvious source of infection.  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  - Liver/pancreatic mass s/p biopsy 6/17 at [facility].  - CT abdomen 11/9: there are innumerable hypoenhancing lesion seen throughout the hepatic parenchyma and a pancreatic mass.Thrombus is seen within the inferior vena cava right at the junction of the inferior vena cava and right atrium.  - MRI brain shows no evidence of mets  - On " Lovenox 70mg q24.  - Medical records from [facility] show liver biopsy consistent with carcinoma, GATA3+, but the morphology and immunohistochemical profile are not consistent with metastasis from her renal tumor.  - Hem/Onc consulted, appreciate recs.  - Recommended f/u withing [facility] system.  -  Urine cytology, FOBT pending.                VTE Risk Mitigation         Ordered     enoxaparin injection 70 mg  Every 24 hours (non-standard times)     Route:  Subcutaneous        11/09/17 1124     High Risk of VTE  Once      11/09/17 0739     Place sequential compression device  Until discontinued      11/09/17 0739     Place sequential compression device  Until discontinued      11/08/17 2200              GRABIEL Resendez  Department of Hospital Medicine   [facility]

## 2017-11-15 NOTE — PLAN OF CARE
Problem: Occupational Therapy Goal  Goal: Occupational Therapy Goal  Goals to be met by: 12/2    Patient will increase functional independence with ADLs by performing:    UE Dressing with [place].  LE Dressing with [place].  Grooming while standing at sink with Stand-by Assistance. Met (11/15/2017)   Toileting from toilet with Stand-by Assistance for hygiene and clothing management.      Outcome: Ongoing (interventions implemented as appropriate)  Goals updated and on-going goals remain appropriate    Comments: Cont OT POC

## 2017-11-15 NOTE — PT/OT/SLP PROGRESS
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  Patient communicating pain, "I cant do this today". Agrees to attempt to walk with encouragement from bedside family.     Pain/Comfort  Pain Rating 1: 10/10 (12/10, no apparent signs of distress)  Location - Side 1: Right  Location - Orientation 1: lateral  Location 1: abdomen    Objective:   Patient found with: telemetry    Functional Mobility:  Bed Mobility:   Rolling/Turning to Left: Supervision  Supine to Sit: Supervision  Sit to Supine: Supervision    Transfers:  Sit <> Stand Assistance: Stand By Assistance (x3 trials)  Sit <> Stand Assistive Device: No Assistive Device  Bed <> Chair Technique: Stand Pivot  Bed <> Chair Assistance: Stand By Assistance  Bed <> Chair Assistive Device: No Assistive Device    Gait:   Gait Distance: 6' in room    Balance:   Static Sit: GOOD: Takes MODERATE challenges from all directions  Dynamic Sit: GOOD: Maintains balance through MODERATE excursions of active trunk movement  Static Stand: FAIR+: Takes MINIMAL challenges from all directions  Dynamic stand: FAIR+: Needs CLOSE SUPERVISION during gait and is able to right self with minor LOB     Therapeutic Activities and Exercises:  Patient attempted ambulation but unsuccessful.   Patient assisted w/ stand pivot transfer to bedside recliner    Patient re-educated on seated therex to perform and addition of deep breathing exercises to perform while sitting in bedside recliner.      AM-PAC 6 CLICK MOBILITY  How much help from " another person does this patient currently need?   1 = Unable, Total/Dependent Assistance  2 = A lot, Maximum/Moderate Assistance  3 = A little, Minimum/Contact Guard/Supervision  4 = None, Modified [place]/Independent    Turning over in bed (including adjusting bedclothes, sheets and blankets)?: 4  Sitting down on and standing up from a chair with arms (e.g., wheelchair, bedside commode, etc.): 3  Moving from lying on back to sitting on the side of the bed?: 4  Moving to and from a bed to a chair (including a wheelchair)?: 3  Need to walk in hospital room?: 3  Climbing 3-5 steps with a railing?: 2  Total Score: 19    AM-PAC Raw Score CMS G-Code Modifier Level of Impairment Assistance   6 % Total / Unable   7 - 9 CM 80 - 100% Maximal Assist   10 - 14 CL 60 - 80% Moderate Assist   15 - 19 CK 40 - 60% Moderate Assist   20 - 22 CJ 20 - 40% Minimal Assist   23 CI 1-20% SBA / CGA   24 CH 0% Independent/ Mod I     Patient left up in chair with all lines intact, call button in reach, nurse notified and family present.    Assessment:  Yao Chapa is a 62 y.o. 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 Patient c/o pain today and demonstrates poor motivation for out of bed activity today. Patient agrees to sit in bedside recliner. Patient was not able to ambulate today 2/2 c/o pain, nursing was notified. Patient will continue to benefit from skilled PT services to address listed impairments.     Rehab identified problem list/impairments: Rehab identified problem list/impairments: weakness, impaired endurance, gait instability    Rehab potential is good.    Activity tolerance: Fair    Discharge recommendations: Discharge Facility/Level Of Care Needs: nursing facility, skilled     Barriers to discharge: Barriers to Discharge: Inaccessible home environment, Decreased caregiver  support    Equipment recommendations: Equipment Needed After Discharge: bath bench     GOALS:    Physical Therapy Goals        Problem: Physical Therapy Goal    Goal Priority Disciplines Outcome Goal Variances Interventions   Physical Therapy Goal     PT/OT, PT Ongoing (interventions implemented as appropriate)     Description:  Goals to be met by: 17     Patient will increase functional independence with mobility by performin. Supine to sit with Stand-by Assistance- MET  2. Sit to supine with Stand-by Assistance- MET  3. Sit to stand transfer with Stand-by Assistance   4. Bed to chair transfer with Stand-by Assistance using Rolling Walker   5. Gait  x 100 feet with Stand-by Assistance using Rolling Walker.   6. Ascend/descend 4 stair with no Handrails Contact Guard Assistance  7. Lower extremity exercise program x15 reps per handout, with independence                       PLAN:    Patient to be seen 4 x/week  to address the above listed problems via gait training, therapeutic activities, therapeutic exercises, neuromuscular re-education  Plan of Care expires: 17  Plan of Care reviewed with: patient         Filiberto Youngblood, PT  11/15/2017

## 2017-11-15 NOTE — PLAN OF CARE
Sw did receive [facility] , informed Sw that Pt and her spoke in detail. Pt has an appt on Monday with oncology at [facility] to address long term d/c plans with Pt's medical course and Tierra gave information to sister Lacie and Pt if Pt gets no stronger or any better with outpatient therapy, Pt may be admitted from home if warranted. Sw to assist with transport resources to take Pt to and from outpatient therapy appointments. CM aware, Sw to follow.

## 2017-11-15 NOTE — SUBJECTIVE & OBJECTIVE
Review of Systems   Constitutional: Positive for activity change, appetite change and unexpected weight change. Negative for chills and fever.   Respiratory: Negative for cough and shortness of breath.    Cardiovascular: Negative for chest pain, palpitations and leg swelling.   Gastrointestinal: Negative for abdominal pain, diarrhea and vomiting.   Genitourinary: Negative for difficulty urinating.   Skin: Negative for pallor and wound.   Neurological: Negative for dizziness and light-headedness.   Psychiatric/Behavioral: Negative for agitation and confusion.     Objective:     Vital Signs (Most Recent):  Temp: 98.4 °F (36.9 °C) (11/15/17 1131)  Pulse: 110 (11/15/17 1131)  Resp: 20 (11/15/17 1131)  BP: 119/67 (11/15/17 1131)  SpO2: 98 % (11/15/17 1131) Vital Signs (24h Range):  Temp:  [98 °F (36.7 °C)-99.8 °F (37.7 °C)] 98.4 °F (36.9 °C)  Pulse:  [104-116] 110  Resp:  [16-20] 20  SpO2:  [94 %-98 %] 98 %  BP: (107-120)/(65-71) 119/67     Weight: 49.5 kg (109 lb 1.6 oz)  Body mass index is 18.73 kg/m².    Intake/Output Summary (Last 24 hours) at 11/15/17 1356  Last data filed at 11/15/17 0446   Gross per 24 hour   Intake                0 ml   Output                0 ml   Net                0 ml      Physical Exam   Constitutional: No distress.   Eyes: Pupils are equal, round, and reactive to light.   Cardiovascular: Normal rate, regular rhythm, normal heart sounds and intact distal pulses.    Pulmonary/Chest: Effort normal and breath sounds normal.   Abdominal: Soft. Bowel sounds are normal.   Neurological: She is alert.   A&O x2   Nursing note and vitals reviewed.      Significant Labs: All pertinent labs within the past 24 hours have been reviewed.

## 2017-11-15 NOTE — PLAN OF CARE
Sw did upload OT note from this am to [facility], Sarah to follow with decision and Tierra monge from [facility]. PT note uploaded to [facility], Sarah to follow.

## 2017-11-15 NOTE — ASSESSMENT & PLAN NOTE
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 Likely histo resemblance as Gu/breast (based on GATA3+) or cholangiocarcinoma as primary.   -- Hem/Onc consulted. To follow up with [facility] onc.

## 2017-11-15 NOTE — PROGRESS NOTES
Transport staff here to transport patient back to nursing home by stretcher. Day shift Rn Viral reported pt family was informed oh her shift of her returning back to NH.

## 2017-11-15 NOTE — PLAN OF CARE
Call placed to ORLANDO Reno at [facility] ( [phone]) to discuss discharge planning. This CM informed Shadia that [facility] and CM/SW were waiting to see if patient was accepted or denied. If patient denied family states that they would like to take Ms. Chapa home. Will continue to follow.    Indy Blair RN  Ext [phone]

## 2017-11-15 NOTE — PLAN OF CARE
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       Acitivities:        - Up in a chair each morning as tolerated   - Ambulate with assistance to bathroom    LABS:  Per facility protocol       Nursing Precautions:     - Aspiration precautions:    - Fall precautions per nursing home protocol   - Seizure precaution per nursing home protocol                CONSULTS:     Physical Therapy to evaluate and treat     Occupational Therapy to evaluate and treat     Speech Therapy  to evaluate and treat     Nutrition to evaluate and recommend diet        MISCELLANEOUS CARE:               Routine Skin for Bedridden Patients:  Apply moisture barrier cream to all    skin folds and wet areas in perineal area daily and after baths and                           all bowel movements.                                 Medications: Discontinue all previous medication  orders, if any. See new list below.     Yao Chapa   Home Medication Instructions DANK:[id number]    Printed on:11/15/17 0954   Medication Information                      enoxaparin (LOVENOX) 80 mg/0.8 mL Syrg  Inject 0.7 mLs (70 mg total) into the skin once daily.                       _________________________________  GRABIEL Resendez  11/15/2017

## 2017-11-15 NOTE — PT/OT/SLP PROGRESS
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  Pt agreeable to OT session  Pain/Comfort  Pain Rating 1: 0/10    Objective:  Patient found with: telemetry     Functional Mobility:  Bed Mobility:  Rolling/Turning to Left: Supervision  Scooting/Bridging: Supervision  Supine to Sit: Supervision  Sit to Supine: Supervision    Transfers:   Sit <> Stand Assistance: Supervision  Sit <> Stand Assistive Device: No Assistive Device    Functional Ambulation: Pt ambulated short household distance SBA and no AD - extra time taken to ambulate 2/2 decreased endurance     Activities of Daily Living:  Grooming Position: Standing at sink  Grooming Level of Assistance: Supervision          Balance:   Static Sit: GOOD: Takes MODERATE challenges from all directions  Dynamic Sit: GOOD: Maintains balance through MODERATE excursions of active trunk movement  Static Stand: FAIR+: Takes MINIMAL challenges from all directions  Dynamic stand: FAIR+: Needs CLOSE SUPERVISION during gait and is able to right self with minor LOB    Therapeutic Activities and Exercises:  Pt educated on:  - OT POC  - Importance of OOB activity  - Safety with functional mobility   - Grooming task completed at sink w/ Supvn; pt requested to sit EOB to complete remaining grooming tasks 2/2 fatigue - pt reports fatigue resolved while sitting EOB  - Pt completed BLE exercises sitting EOB and BUE exercises w/ rolled towel ( 2x10 each) - pt tolerated exercises well and reported no experienced  "fatigue.   - White board updated     AM-PAC 6 CLICK ADL   How much help from another person does this patient currently need?   1 = Unable, Total/Dependent Assistance  2 = A lot, Maximum/Moderate Assistance  3 = A little, Minimum/Contact Guard/Supervision  4 = None, Modified [place]/Independent    Putting on and taking off regular lower body clothing? : 3  Bathing (including washing, rinsing, drying)?: 3  Toileting, which includes using toilet, bedpan, or urinal? : 4  Putting on and taking off regular upper body clothing?: 4  Taking care of personal grooming such as brushing teeth?: 4  Eating meals?: 4  Total Score: 22     AM-PAC Raw Score CMS "G-Code Modifier Level of Impairment Assistance   6 % Total / Unable   7 - 8 CM 80 - 100% Maximal Assist   9-13 CL 60 - 80% Moderate Assist   14 - 19 CK 40 - 60% Moderate Assist   20 - 22 CJ 20 - 40% Minimal Assist   23 CI 1-20% SBA / CGA   24 CH 0% Independent/ Mod I       Patient left sitting EOB with call button in reach and medical team  present    ASSESSMENT:  Yao Chapa is a 62 y.o. 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 Pt continues to have difficulty completing standing ADL tasks and functional mobility 2/2 fatigue w/ activity and overall weakness. Pt continues to benefit from OT services to address the below impairments and maximize function. OT rec SNF to further therapy and optimize safe and independent function.     Rehab identified problem list/impairments: Rehab identified problem list/impairments: weakness, impaired endurance, gait instability    Rehab potential is good.    Activity tolerance: Fair    Discharge recommendations: Discharge Facility/Level Of Care Needs: nursing facility, skilled     Barriers to discharge: Barriers to Discharge: Inaccessible home environment, Decreased " caregiver support    Equipment recommendations: bath bench     GOALS:    Occupational Therapy Goals        Problem: Occupational Therapy Goal    Goal Priority Disciplines Outcome Interventions   Occupational Therapy Goal     OT, PT/OT Ongoing (interventions implemented as appropriate)    Description:  Goals to be met by: 12/2    Patient will increase functional independence with ADLs by performing:    UE Dressing with [place].  LE Dressing with [place].  Grooming while standing at sink with Stand-by Assistance. Met (11/15/2017)   Toileting from toilet with Stand-by Assistance for hygiene and clothing management.                        Plan:  Patient to be seen 3 x/week to address the above listed problems via self-care/home management, therapeutic activities, therapeutic exercises  Plan of Care expires: 12/09/17  Plan of Care reviewed with: patient         Jennifer Zamarripa, OT  11/15/2017

## 2017-11-16 VITALS
BODY MASS INDEX: 18.63 KG/M2 | WEIGHT: 109.13 LBS | TEMPERATURE: 97 F | HEIGHT: 64 IN | OXYGEN SATURATION: 96 % | DIASTOLIC BLOOD PRESSURE: 79 MMHG | HEART RATE: 107 BPM | SYSTOLIC BLOOD PRESSURE: 139 MMHG | RESPIRATION RATE: 18 BRPM

## 2017-11-16 PROBLEM — I82.220 IVC THROMBOSIS: Status: ACTIVE | Noted: 2017-11-16

## 2017-11-16 LAB
ANISOCYTOSIS BLD QL SMEAR: SLIGHT
BASOPHILS # BLD AUTO: 0.05 K/UL
BASOPHILS NFR BLD: 0.2 %
DIFFERENTIAL METHOD: ABNORMAL
EOSINOPHIL # BLD AUTO: 0.4 K/UL
EOSINOPHIL NFR BLD: 1.5 %
ERYTHROCYTE [DISTWIDTH] IN BLOOD BY AUTOMATED COUNT: 15.9 %
HCT VFR BLD AUTO: 24.6 %
HGB BLD-MCNC: 8 G/DL
IMM GRANULOCYTES # BLD AUTO: 0.78 K/UL
IMM GRANULOCYTES NFR BLD AUTO: 3.1 %
INR PPP: 3
LYMPHOCYTES # BLD AUTO: 1.8 K/UL
LYMPHOCYTES NFR BLD: 7.2 %
MCH RBC QN AUTO: 23.1 PG
MCHC RBC AUTO-ENTMCNC: 32.5 G/DL
MCV RBC AUTO: 71 FL
MONOCYTES # BLD AUTO: 1.7 K/UL
MONOCYTES NFR BLD: 6.6 %
NEUTROPHILS # BLD AUTO: 20.4 K/UL
NEUTROPHILS NFR BLD: 81.4 %
NRBC BLD-RTO: 0 /100 WBC
PLATELET # BLD AUTO: 206 K/UL
PLATELET BLD QL SMEAR: ABNORMAL
PMV BLD AUTO: 11.7 FL
PROTHROMBIN TIME: 30.1 SEC
RBC # BLD AUTO: 3.47 M/UL
WBC # BLD AUTO: 25.01 K/UL

## 2017-11-16 PROCEDURE — 99238 HOSP IP/OBS DSCHRG MGMT 30/<: CPT | Mod: ,,, | Performed by: HOSPITALIST

## 2017-11-16 PROCEDURE — 36415 COLL VENOUS BLD VENIPUNCTURE: CPT

## 2017-11-16 PROCEDURE — 63600175 PHARM REV CODE 636 W HCPCS: Performed by: STUDENT IN AN ORGANIZED HEALTH CARE EDUCATION/TRAINING PROGRAM

## 2017-11-16 PROCEDURE — 63600175 PHARM REV CODE 636 W HCPCS: Performed by: INTERNAL MEDICINE

## 2017-11-16 PROCEDURE — 85610 PROTHROMBIN TIME: CPT

## 2017-11-16 PROCEDURE — 85025 COMPLETE CBC W/AUTO DIFF WBC: CPT

## 2017-11-16 RX ORDER — ACETAMINOPHEN 325 MG/1
325 TABLET ORAL 3 TIMES DAILY PRN
Qty: 9 TABLET | Refills: 0 | Status: SHIPPED | OUTPATIENT
Start: 2017-11-16

## 2017-11-16 RX ORDER — PHYTONADIONE 5 MG/1
5 TABLET ORAL DAILY
Qty: 3 TABLET | Refills: 0 | Status: SHIPPED | OUTPATIENT
Start: 2017-11-16

## 2017-11-16 RX ADMIN — METOCLOPRAMIDE 5 MG: 5 INJECTION, SOLUTION INTRAMUSCULAR; INTRAVENOUS at 01:11

## 2017-11-16 RX ADMIN — ENOXAPARIN SODIUM 70 MG: 100 INJECTION SUBCUTANEOUS at 01:11

## 2017-11-16 NOTE — PLAN OF CARE
"Sw met with Pt and provided medicaid transportation resources to assist with appointments. Sw informed he will meet with sister Lacie and inform her of the above too. Sw to follow. Sw met with sister, informed her of the medicaid resource transportation benefit that pt has. Sister informed she "has transportation for now," Sw encouraged sister and Pt to use medicaid for Pt if needed as this is a amenity that Pt's insurance allows. Sister did verbalize understanding and appreciated Sw's input.   "

## 2017-11-16 NOTE — PROGRESS NOTES
Discharge instructions given to patient and sister at bedside. Questions answered and medications reviewed. PIV removed without any complications. NAD noted. Pt previously received instruction on lovenox administration and informed of next dose meds. Pt being transported off floor by PCT and sister via wheelchair. No other events at this time.

## 2017-11-16 NOTE — NURSING
Educated patient sister(Lacie Yu) on how to give lovenox injection sister verbalizes understanding on how to give injection

## 2017-11-16 NOTE — ASSESSMENT & PLAN NOTE
-- started on Lovenox daily dosing.   -- patient and her sister educated on using lovenox injections at home.

## 2017-11-16 NOTE — DISCHARGE SUMMARY
"[facility] Hospital Medicine  Discharge Summary      Patient Name: Yao Chapa  MRN: [id number]  Admission Date: 11/8/2017  Hospital Length of Stay: 8 days  Discharge Date and Time:  11/16/2017 3:25 PM  Attending Physician: Vikram Epstein, *   Discharging Provider: Mamadou Cabral MD  Primary Care Provider: Primary Doctor [facility] Medicine Team: [facility] 2 Mamadou Cabral MD    HPI:   This is a 63 y/o female with history of Renal mass s/p right nephrectomy 2016, liver and pancreatic mass s/p biopsy 6/2017 and memory problems for over 1 year. Presented to ED with weakness, decreased appetite, nausea and abdominal discomfort.    She  Has been in her usual state of health until 4 days ago when she became more sleepy eating less and feels drained. She reports some nausea and RLQ pain that is more discomfort "gas", pain was improved by OTC medication, She state that she didn't have BM for 4 days"possiblly due to her not eating much" she had lost 10-15 lb in couple of months. Denies any vomiting any bleeding per rectum, any dysuria, fever, or chills, no hx of seizures. She and her sister jono pt ever been on chemotherapy or had radiation. She is not on any medication currently.  Pt is following at [facility] where she did the surgery, she has appointment on the 20th nov for follow up on her biopsy results but she prefers here.    Her memory problems started before the nephrectomy, where she forgets things, people but always were oriented.    * No surgery found *      Hospital Course:   In The ED given "ceftrixone, vancomycin and metronidazole", I L Nacl, CT abdomen, head and CXR done with labs.    11/10: still tired with mild memory impairments, tachycardic on exam. consulted hem onc. And pending MRI brain final reads. Working with PT/OT, need SNF.       11/11: Stable. Still disoriented to place and time. MRI brain shows no evidence of mets. Ammonia WNL.     11/12: " Stable. Oriented to person and place. Pending SNF placement. Hem/ Onc recommending f/u with [facility].    11/13: Stable. Oriented to person and place. Pending SNF placement. Hem/ Onc recommending f/u with [facility].    11/14: Stable. Hg dropped to 7.5, but no signs of bleeding. Will trend CBC q12 for today.             Awaiting placement. Hem/ Onc recommending f/u with [facility].  11/15: working with PT/OT. Stable VS and no active complaints. Pending SNF vs home.     11/16: NAEON. Vitals stable. Discharged to home with outpatient PT/OT referral.              Educated patient and family about Lovenox injections.              Reaffirmed outpatient hem/onc f/u at [facility]  INR elevated at 3.0  Factor 5 normal, Factor 8 elevated.  Given Vit k 5mg once daily x 3days at discharge     Review of Systems   Constitutional: Positive for malaise/fatigue and weight loss. Negative for chills and fever.   Respiratory: Negative for cough and shortness of breath.    Cardiovascular: Negative for chest pain and palpitations.   Gastrointestinal: Negative for abdominal pain, diarrhea and vomiting.   Neurological: Negative for dizziness, tremors and loss of consciousness.     Physical Exam   Constitutional: No distress.   Eyes: Pupils are equal, round, and reactive to light.   Cardiovascular: Normal rate, regular rhythm, normal heart sounds and intact distal pulses.    Pulmonary/Chest: Effort normal and breath sounds normal.   Abdominal: Soft. Bowel sounds are normal.   Musculoskeletal: She exhibits no edema.   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  - Liver/pancreatic mass s/p biopsy 6/17 at [facility].  - CT abdomen 11/9: there are innumerable hypoenhancing lesion seen throughout the hepatic parenchyma and a pancreatic mass.Thrombus is seen within the inferior vena cava right at the junction of the inferior vena cava and right atrium.  - MRI brain shows no evidence of mets  - On Lovenox 70mg q24.  - Hem/Onc consulted, appreciate recs.  - Medical records from [facility] show liver biopsy consistent with carcinoma, GATA3+, but the morphology and immunohistochemical profile are not consistent with metastasis from her renal tumor.  - Recommended f/u withing [facility] system.  -  Urine cytology, FOBT pending.  - Discharged today after educating the patient and her sister on daily Lovenox injections (70mg)  *INR elevated at 3.0 today  Factor 5 normal, Factor 8 elevated.  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 Likely histo resemblance as Gu/breast (based on GATA3+) or cholangiocarcinoma as primary.   -- Hem/Onc consulted. To follow up with [facility] onc.     *INR elevated at 3.0  Factor 5 normal, Factor 8 elevated.  Given Vit k 5mg once daily x 3days at discharge  - Scheduled for follow up with oncologist at [facility] on 11/20        Microcytic anemia    - Most likely iron deficiency anemia due to malnuration  - FOBT pending.  - Hg dropped to 7.5 today from 8.5  - No signs of any active bleeding.  -H/H 8/24.6 today            IVC thrombosis    -- started on Lovenox daily dosing.   -- patient and her sister educated on using lovenox injections at home.        Cancer with unknown primary site    -- by [facility] path   -- consulted hem /onc  -- no signs of obstruction.           Weakness    - most likely due to dehydration from poor oral intake, as her power on examination was normal but pt still feels weak and has not been  "ambulating.  - Pt/OT consulted, SNF recommended           Mild malnutrition    - Poor oral intake.  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    - S/p right nephrectomy in 2016          Leukocytosis    - SIRS 2/4 "tachycardia and leucocytosis", improving WBC trends.   - Pt has no obvious source of infection.  - Blood Cx NGTD, UA is negative  - keep off Abx          Final Active Diagnoses:    Diagnosis Date Noted POA    PRINCIPAL PROBLEM:  Neoplasm of right kidney with thrombus of inferior vena cava [D49.511, I82.220] 11/08/2017 Yes    Liver masses [R16.0] 11/08/2017 Yes    Microcytic anemia [D50.9] 11/08/2017 Yes    IVC thrombosis [I82.220] 11/15/2017 Unknown    Cancer with unknown primary site [C80.1] 11/10/2017 Yes    Weakness [R53.1] 11/09/2017 Yes    Leukocytosis [D72.829] 11/08/2017 Yes    Mass of pancreas [K86.9] 11/08/2017 Yes    History of renal cell carcinoma [Z85.528] 11/08/2017 Not Applicable    History of nephrectomy [Z90.5] 11/08/2017 Not Applicable    Mild malnutrition [E44.1] 11/08/2017 Yes      Problems Resolved During this Admission:    Diagnosis Date Noted Date Resolved POA       Discharged Condition: fair    Disposition: Home or Self Care    Follow Up:    Patient Instructions:     Ambulatory consult to Physical Therapy   Referral Priority: Routine Referral Type: Physical Medicine   Referral Reason: Specialty Services Required    Requested Specialty: Physical Therapy    Number of Visits Requested: 1      Ambulatory consult to Occupational Therapy   Referral Priority: Routine Referral Type: Occupational Therapy   Referral Reason: Specialty Services Required    Requested Specialty: Occupational Therapy    Number of Visits Requested: 1      Diet general     Diet general     Diet Cardiac     Activity as tolerated     Call MD for:  " temperature >100.4     Call MD for:  persistent nausea and vomiting or diarrhea     Call MD for:  severe uncontrolled pain     Call MD for:  redness, tenderness, or signs of infection (pain, swelling, redness, odor or green/yellow discharge around incision site)     Call MD for:  difficulty breathing or increased cough     Call MD for:  severe persistent headache     Call MD for:  worsening rash     Call MD for:  persistent dizziness, light-headedness, or visual disturbances     Call MD for:  increased confusion or weakness     Activity as tolerated     Call MD for:  temperature >100.4     Call MD for:  severe uncontrolled pain     Call MD for:  persistent nausea and vomiting or diarrhea     Call MD for:  redness, tenderness, or signs of infection (pain, swelling, redness, odor or green/yellow discharge around incision site)     Call MD for:  difficulty breathing or increased cough     Call MD for:  severe persistent headache     Call MD for:  worsening rash     Call MD for:  persistent dizziness, light-headedness, or visual disturbances     Call MD for:  increased confusion or weakness         Significant Diagnostic Studies: Labs:   BMP: No results for input(s): GLU, NA, K, CL, CO2, BUN, CREATININE, CALCIUM, MG in the last 48 hours., CMP No results for input(s): NA, K, CL, CO2, GLU, BUN, CREATININE, CALCIUM, PROT, ALBUMIN, BILITOT, ALKPHOS, AST, ALT, ANIONGAP, ESTGFRAFRICA, EGFRNONAA in the last 48 hours., CBC   Recent Labs  Lab 11/14/17 2005 11/16/17  0648   WBC 23.17* 25.01*   HGB 8.1* 8.0*   HCT 26.2* 24.6*    206   , INR   Lab Results   Component Value Date    INR 3.0 (H) 11/16/2017    INR 1.9 (H) 11/15/2017    INR 1.9 (H) 11/10/2017   , Lipid Panel No results found for: CHOL, HDL, LDLCALC, TRIG, CHOLHDL, Troponin No results for input(s): TROPONINI in the last 168 hours., A1C:   Recent Labs  Lab 11/09/17  0015 11/09/17  0805   HGBA1C 5.3 5.3    and All labs within the past 24 hours have been  reviewed  Radiology: CT scan: CT ABDOMEN PELVIS WITH CONTRAST:   Results for orders placed or performed during the hospital encounter of 11/08/17   CT Abdomen Pelvis With Contrast    Narrative    CT abdomen and pelvis with contrast    History: Abdominal pain    There are innumerable low attenuation lesions seen throughout the liver with the largest seen in the right lobe measuring 7.4 cm.  There is a lymph node seen adjacent to the anterior liver measuring 1.0 cm.    There is a 1.2 cm filling defect seen within the inferior feet vena cava as it enters the right atrium.    There is a 3.6 cm hypoenhancing mass involving the pancreatic tail.    The right kidney is absent.    There is no evidence bowel obstruction.    The osseous structures are unremarkable.    Impression    Findings metastatic disease.  Specifically there are innumerable hypoenhancing lesion seen throughout the hepatic parenchyma and a pancreatic mass.    Thrombus is seen within the inferior vena cava right at the junction of the inferior vena cava and right atrium.    This report has been flagged as abnormal in EPIC.       Electronically signed by: ERNESTINE LOGAN MD  Date:     11/08/17  Time:    19:18     and CT ABDOMEN PELVIS WITHOUT CONTRAST: No results found for this visit on 11/08/17.    Pending Diagnostic Studies:     Procedure Component Value Units Date/Time    Vitamin A [[id number]] Collected:  11/15/17 0831    Order Status:  Sent Lab Status:  In process Updated:  11/15/17 0843    Specimen:  Blood from Blood     Vitamin K [[id number]] Collected:  11/15/17 0831    Order Status:  Sent Lab Status:  In process Updated:  11/15/17 0843    Specimen:  Blood from Blood          Medications:  Reconciled Home Medications:   Current Discharge Medication List      START taking these medications    Details   acetaminophen (TYLENOL) 325 MG tablet Take 1 tablet (325 mg total) by mouth 3 (three) times daily as needed for Pain.  Qty: 9 tablet, Refills: 0       enoxaparin (LOVENOX) 80 mg/0.8 mL Syrg Inject 0.7 mLs (70 mg total) into the skin once daily.  Qty: 30 Syringe, Refills: 5      phytonadione, vitamin K1, (MEPHYTON) 5 mg Tab Take 1 tablet (5 mg total) by mouth once daily.  Qty: 3 tablet, Refills: 0             Indwelling Lines/Drains at time of discharge:   Lines/Drains/Airways          No matching active lines, drains, or airways          Time spent on the discharge of patient: >30 minutes  Patient was seen and examined on the date of discharge and determined to be suitable for discharge.         Mamadou Cabral MD  Department of Hospital Medicine  [facility]

## 2017-11-16 NOTE — ASSESSMENT & PLAN NOTE
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 Likely histo resemblance as Gu/breast (based on GATA3+) or cholangiocarcinoma as primary.   -- Hem/Onc consulted. To follow up with [facility] onc.     *INR elevated at 3.0  Factor 5 normal, Factor 8 elevated.  Given Vit k 5mg once daily x 3days at discharge  - Scheduled for follow up with oncologist at [facility] on 11/20

## 2017-11-16 NOTE — ASSESSMENT & PLAN NOTE
- Most likely iron deficiency anemia due to malnuration  - FOBT pending.  - Hg dropped to 7.5 today from 8.5  - No signs of any active bleeding.  -H/H 8/24.6 today

## 2017-11-16 NOTE — ASSESSMENT & PLAN NOTE
- S/p right nephrectomy 11/2016 in [facility].  - Liver/pancreatic mass s/p biopsy 6/17 at [facility].  - CT abdomen 11/9: there are innumerable hypoenhancing lesion seen throughout the hepatic parenchyma and a pancreatic mass.Thrombus is seen within the inferior vena cava right at the junction of the inferior vena cava and right atrium.  - MRI brain shows no evidence of mets  - On Lovenox 70mg q24.  - Hem/Onc consulted, appreciate recs.  - Medical records from [facility] show liver biopsy consistent with carcinoma, GATA3+, but the morphology and immunohistochemical profile are not consistent with metastasis from her renal tumor.  - Recommended f/u withing [facility] system.  -  Urine cytology, FOBT pending.  - Discharged today after educating the patient and her sister on daily Lovenox injections (70mg)  *INR elevated at 3.0 today  Factor 5 normal, Factor 8 elevated.  Given Vit k 5mg once daily x 3days at discharge

## 2017-11-17 NOTE — PLAN OF CARE
11/17/17 0751   Final Note   Assessment Type Final Discharge Note   Discharge Disposition Home   What phone number can be called within the next 1-3 days to see how you are doing after discharge? [phone]   Hospital Follow Up  Appt(s) scheduled? (Pt to f/u at [facility].)   Right Care Referral Info   Post Acute Recommendation No Care

## 2017-11-19 NOTE — PT/OT/SLP DISCHARGE
Occupational Therapy Discharge Summary    Yao Chapa  MRN: [id number]   Neoplasm of right kidney with thrombus of inferior vena cava   Patient Discharged from acute Occupational Therapy on 11/16/2017.  Please refer to prior OT note dated on 11/15/2017 for functional status.     Assessment:   Patient was discharge unexpectedly.  Information required to complete and accurate discharge summary is unknown.  Refer to therapy initial evaluation and last progress note for initial and most recent functional status and goal achievement.  Recommendations made may be found in medical record.  GOALS:    Occupational Therapy Goals        Problem: Occupational Therapy Goal    Goal Priority Disciplines Outcome Interventions   Occupational Therapy Goal     OT, PT/OT Ongoing (interventions implemented as appropriate)    Description:  Goals to be met by: 12/2    Patient will increase functional independence with ADLs by performing:    UE Dressing with [place].  LE Dressing with [place].  Grooming while standing at sink with Stand-by Assistance. Met (11/15/2017)   Toileting from toilet with [employer]-by Assistance for hygiene and clothing management.                      Reasons for Discontinuation of Therapy Services  Satisfactory goal achievement.      Plan:  Patient Discharged to: Home.        Jennifer Zamarripa OT  11/19/2017

## 2017-11-20 LAB — VIT A SERPL-MCNC: <13 UG/DL (ref 38–106)

## 2017-11-21 LAB — PHYTONADIONE SERPL-MCNC: <72 PG/ML (ref 80–1160)
